# Patient Record
Sex: FEMALE | Race: WHITE | NOT HISPANIC OR LATINO | URBAN - METROPOLITAN AREA
[De-identification: names, ages, dates, MRNs, and addresses within clinical notes are randomized per-mention and may not be internally consistent; named-entity substitution may affect disease eponyms.]

---

## 2019-04-16 ENCOUNTER — EMERGENCY (EMERGENCY)
Facility: HOSPITAL | Age: 24
LOS: 1 days | Discharge: HOME | End: 2019-04-16
Attending: EMERGENCY MEDICINE | Admitting: EMERGENCY MEDICINE
Payer: COMMERCIAL

## 2019-04-16 VITALS
OXYGEN SATURATION: 100 % | HEART RATE: 88 BPM | SYSTOLIC BLOOD PRESSURE: 144 MMHG | RESPIRATION RATE: 14 BRPM | WEIGHT: 102.96 LBS | DIASTOLIC BLOOD PRESSURE: 74 MMHG | TEMPERATURE: 98 F

## 2019-04-16 PROCEDURE — 99283 EMERGENCY DEPT VISIT LOW MDM: CPT

## 2019-04-16 RX ORDER — CYCLOBENZAPRINE HYDROCHLORIDE 10 MG/1
1 TABLET, FILM COATED ORAL
Qty: 15 | Refills: 0 | OUTPATIENT
Start: 2019-04-16
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
## 2019-04-16 NOTE — ED ADULT NURSE NOTE - NSIMPLEMENTINTERV_GEN_ALL_ED
Implemented All Universal Safety Interventions:  [place] to call system. Call bell, personal items and telephone within reach. Instruct patient to call for assistance. Room bathroom lighting operational. Non-slip footwear when patient is off stretcher. Physically safe environment: no spills, clutter or unnecessary equipment. Stretcher in lowest position, wheels locked, appropriate side rails in place.

## 2023-02-06 ENCOUNTER — NON-APPOINTMENT (OUTPATIENT)
Age: 28
End: 2023-02-06

## 2023-03-13 ENCOUNTER — APPOINTMENT (OUTPATIENT)
Dept: OBGYN | Facility: CLINIC | Age: 28
End: 2023-03-13

## 2023-05-03 ENCOUNTER — APPOINTMENT (OUTPATIENT)
Dept: OBGYN | Facility: CLINIC | Age: 28
End: 2023-05-03

## 2023-06-01 NOTE — ED ADULT NURSE NOTE - NS ED NURSE RECORD ANOTHER VITAL SIGN
Called Raul.  He thinks he may want to proceed with cataract surgery on his left eye (mature cataract, hx of amblyopia).  He has talked it over with his family and they think he should \"take a chance on it\". He just wanted to discuss the risks that Dr. Paul had discussed with him in January again.  We went over the risk of removing a mature cataract, including loss of zonules and the need to then be referred to a retinal specialist for further surgery.  Raul said he would have trouble finding someone to drive him to [place].  I explained that he will want to just have someone in mind in the event a complication occurs.  Also discussed the need for an ultrasound (B-scan) on the left eye prior to surgery.  Said the nearest location to [place] for that would be [place].  We made an appt for Raul so Dr. Paul can get another look at the cataract and discuss a game plan with Raul.   
Patient is calling wanting to talk over having cataract surgery in their left eye.     Please Advise.    Thank you.   
No

## 2023-10-24 ENCOUNTER — APPOINTMENT (OUTPATIENT)
Dept: OBGYN | Facility: CLINIC | Age: 28
End: 2023-10-24

## 2023-11-06 PROBLEM — Z78.9 OTHER SPECIFIED HEALTH STATUS: Chronic | Status: ACTIVE | Noted: 2019-04-16

## 2023-11-29 ENCOUNTER — APPOINTMENT (OUTPATIENT)
Dept: INTERNAL MEDICINE | Facility: CLINIC | Age: 28
End: 2023-11-29
Payer: COMMERCIAL

## 2023-11-29 ENCOUNTER — TRANSCRIPTION ENCOUNTER (OUTPATIENT)
Age: 28
End: 2023-11-29

## 2023-11-29 VITALS
WEIGHT: 107 LBS | SYSTOLIC BLOOD PRESSURE: 134 MMHG | BODY MASS INDEX: 18.27 KG/M2 | HEART RATE: 69 BPM | HEIGHT: 64 IN | OXYGEN SATURATION: 100 % | DIASTOLIC BLOOD PRESSURE: 26 MMHG | TEMPERATURE: 98 F

## 2023-11-29 DIAGNOSIS — Z00.00 ENCOUNTER FOR GENERAL ADULT MEDICAL EXAMINATION W/OUT ABNORMAL FINDINGS: ICD-10-CM

## 2023-11-29 DIAGNOSIS — G43.909 MIGRAINE, UNSPECIFIED, NOT INTRACTABLE, W/OUT STATUS MIGRAINOSUS: ICD-10-CM

## 2023-11-29 DIAGNOSIS — Z83.438 FAMILY HISTORY OF OTHER DISORDER OF LIPOPROTEIN METABOLISM AND OTHER LIPIDEMIA: ICD-10-CM

## 2023-11-29 DIAGNOSIS — Z80.1 FAMILY HISTORY OF MALIGNANT NEOPLASM OF TRACHEA, BRONCHUS AND LUNG: ICD-10-CM

## 2023-11-29 DIAGNOSIS — Z78.9 OTHER SPECIFIED HEALTH STATUS: ICD-10-CM

## 2023-11-29 DIAGNOSIS — Z82.3 FAMILY HISTORY OF STROKE: ICD-10-CM

## 2023-11-29 DIAGNOSIS — Z87.42 PERSONAL HISTORY OF OTHER DISEASES OF THE FEMALE GENITAL TRACT: ICD-10-CM

## 2023-11-29 DIAGNOSIS — G43.009 MIGRAINE W/OUT AURA, NOT INTRACTABLE, W/OUT STATUS MIGRAINOSUS: ICD-10-CM

## 2023-11-29 DIAGNOSIS — Z80.0 FAMILY HISTORY OF MALIGNANT NEOPLASM OF DIGESTIVE ORGANS: ICD-10-CM

## 2023-11-29 DIAGNOSIS — Z83.3 FAMILY HISTORY OF DIABETES MELLITUS: ICD-10-CM

## 2023-11-29 PROCEDURE — 99385 PREV VISIT NEW AGE 18-39: CPT | Mod: 25

## 2023-11-29 PROCEDURE — 36415 COLL VENOUS BLD VENIPUNCTURE: CPT

## 2024-01-18 ENCOUNTER — NON-APPOINTMENT (OUTPATIENT)
Age: 29
End: 2024-01-18

## 2024-01-30 ENCOUNTER — APPOINTMENT (OUTPATIENT)
Dept: DERMATOLOGY | Facility: CLINIC | Age: 29
End: 2024-01-30
Payer: COMMERCIAL

## 2024-01-30 DIAGNOSIS — L70.9 ACNE, UNSPECIFIED: ICD-10-CM

## 2024-01-30 PROCEDURE — 99214 OFFICE O/P EST MOD 30 MIN: CPT

## 2024-01-30 RX ORDER — BENZOYL PEROXIDE 100 MG/ML
10 LIQUID TOPICAL
Qty: 1 | Refills: 6 | Status: ACTIVE | COMMUNITY
Start: 2024-01-30 | End: 1900-01-01

## 2024-01-30 RX ORDER — CLINDAMYCIN PHOSPHATE 10 MG/ML
1 LOTION TOPICAL
Qty: 1 | Refills: 11 | Status: ACTIVE | COMMUNITY
Start: 2024-01-30 | End: 1900-01-01

## 2024-01-30 RX ORDER — CLINDAMYCIN AND BENZOYL PEROXIDE 50; 10 MG/G; MG/G
1-5 GEL TOPICAL
Qty: 1 | Refills: 3 | Status: ACTIVE | COMMUNITY
Start: 2024-01-30 | End: 1900-01-01

## 2024-01-30 NOTE — ASSESSMENT
[FreeTextEntry1] : # Acne, mostly truncal, inflammatory, also with hormonal flares #PIH chronic, flaring prev on dagmar but unable to tolerate  planning on getting pregnant - education, counseling - c/w BP wash but increase to 10%. SED - SWITCH clinda gel--> benzaclin as spot tx - ADD clinda lotion as maint. SED  RTC 3 months
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
## 2024-02-04 LAB
25(OH)D3 SERPL-MCNC: 79.5 NG/ML
ALBUMIN SERPL ELPH-MCNC: 5.1 G/DL
ALP BLD-CCNC: 65 U/L
ALT SERPL-CCNC: 8 U/L
ANION GAP SERPL CALC-SCNC: 11 MMOL/L
APPEARANCE: ABNORMAL
AST SERPL-CCNC: 16 U/L
BACTERIA: NEGATIVE /HPF
BASOPHILS # BLD AUTO: 0.06 K/UL
BASOPHILS NFR BLD AUTO: 0.8 %
BILIRUB SERPL-MCNC: 0.2 MG/DL
BILIRUBIN URINE: NEGATIVE
BLOOD URINE: NEGATIVE
BUN SERPL-MCNC: 15 MG/DL
CALCIUM SERPL-MCNC: 9.8 MG/DL
CAST: 0 /LPF
CHLORIDE SERPL-SCNC: 102 MMOL/L
CHOLEST SERPL-MCNC: 163 MG/DL
CO2 SERPL-SCNC: 26 MMOL/L
COLOR: YELLOW
CREAT SERPL-MCNC: 0.91 MG/DL
EGFR: 88 ML/MIN/1.73M2
EOSINOPHIL # BLD AUTO: 0.06 K/UL
EOSINOPHIL NFR BLD AUTO: 0.8 %
EPITHELIAL CELLS: 11 /HPF
ESTIMATED AVERAGE GLUCOSE: 97 MG/DL
GLUCOSE QUALITATIVE U: NEGATIVE MG/DL
GLUCOSE SERPL-MCNC: 96 MG/DL
HBA1C MFR BLD HPLC: 5 %
HCT VFR BLD CALC: 41.1 %
HDLC SERPL-MCNC: 76 MG/DL
HGB BLD-MCNC: 13.6 G/DL
IMM GRANULOCYTES NFR BLD AUTO: 0.1 %
IRON SATN MFR SERPL: 30 %
IRON SERPL-MCNC: 99 UG/DL
KETONES URINE: NEGATIVE MG/DL
LDLC SERPL CALC-MCNC: 69 MG/DL
LEUKOCYTE ESTERASE URINE: ABNORMAL
LYMPHOCYTES # BLD AUTO: 3.71 K/UL
LYMPHOCYTES NFR BLD AUTO: 49.4 %
MAGNESIUM SERPL-MCNC: 2.2 MG/DL
MAN DIFF?: NORMAL
MCHC RBC-ENTMCNC: 30.4 PG
MCHC RBC-ENTMCNC: 33.1 GM/DL
MCV RBC AUTO: 91.9 FL
MICROSCOPIC-UA: NORMAL
MONOCYTES # BLD AUTO: 0.52 K/UL
MONOCYTES NFR BLD AUTO: 6.9 %
NEUTROPHILS # BLD AUTO: 3.15 K/UL
NEUTROPHILS NFR BLD AUTO: 42 %
NITRITE URINE: NEGATIVE
NONHDLC SERPL-MCNC: 87 MG/DL
PH URINE: 7.5
PLATELET # BLD AUTO: 344 K/UL
POTASSIUM SERPL-SCNC: 4.2 MMOL/L
PROT SERPL-MCNC: 7.4 G/DL
PROTEIN URINE: NORMAL MG/DL
RBC # BLD: 4.47 M/UL
RBC # FLD: 12 %
RED BLOOD CELLS URINE: 1 /HPF
SODIUM SERPL-SCNC: 140 MMOL/L
SPECIFIC GRAVITY URINE: 1.02
TIBC SERPL-MCNC: 334 UG/DL
TRIGL SERPL-MCNC: 102 MG/DL
UIBC SERPL-MCNC: 235 UG/DL
UROBILINOGEN URINE: 0.2 MG/DL
WBC # FLD AUTO: 7.51 K/UL
WHITE BLOOD CELLS URINE: 2 /HPF

## 2024-02-05 ENCOUNTER — TRANSCRIPTION ENCOUNTER (OUTPATIENT)
Age: 29
End: 2024-02-05

## 2024-02-15 ENCOUNTER — APPOINTMENT (OUTPATIENT)
Dept: OBGYN | Facility: CLINIC | Age: 29
End: 2024-02-15

## 2024-03-18 ENCOUNTER — APPOINTMENT (OUTPATIENT)
Dept: OBGYN | Facility: CLINIC | Age: 29
End: 2024-03-18
Payer: COMMERCIAL

## 2024-03-18 VITALS
HEIGHT: 64 IN | WEIGHT: 107 LBS | SYSTOLIC BLOOD PRESSURE: 126 MMHG | BODY MASS INDEX: 18.27 KG/M2 | DIASTOLIC BLOOD PRESSURE: 71 MMHG

## 2024-03-18 DIAGNOSIS — Z01.419 ENCOUNTER FOR GYNECOLOGICAL EXAMINATION (GENERAL) (ROUTINE) W/OUT ABNORMAL FINDINGS: ICD-10-CM

## 2024-03-18 PROCEDURE — 99385 PREV VISIT NEW AGE 18-39: CPT

## 2024-03-18 NOTE — PHYSICAL EXAM
[Appropriately responsive] : appropriately responsive [Alert] : alert [No Acute Distress] : no acute distress [No Murmurs] : no murmurs [Soft] : soft [Non-tender] : non-tender [Non-distended] : non-distended [No HSM] : No HSM [No Mass] : no mass [No Lesions] : no lesions [Oriented x3] : oriented x3 [Examination Of The Breasts] : a normal appearance [No Masses] : no breast masses were palpable [Labia Majora] : normal [Labia Minora] : normal [Normal] : normal [Uterine Adnexae] : normal

## 2024-03-18 NOTE — COUNSELING
[Preconception Care/ Fertility options] : preconception care, fertility options [Vitamins/Supplements] : vitamins/supplements

## 2024-03-26 LAB — CYTOLOGY CVX/VAG DOC THIN PREP: NORMAL

## 2024-04-06 ENCOUNTER — NON-APPOINTMENT (OUTPATIENT)
Age: 29
End: 2024-04-06

## 2024-05-03 ENCOUNTER — APPOINTMENT (OUTPATIENT)
Dept: OBGYN | Facility: CLINIC | Age: 29
End: 2024-05-03
Payer: COMMERCIAL

## 2024-05-03 ENCOUNTER — ASOB RESULT (OUTPATIENT)
Age: 29
End: 2024-05-03

## 2024-05-03 PROCEDURE — 76817 TRANSVAGINAL US OBSTETRIC: CPT

## 2024-05-13 ENCOUNTER — APPOINTMENT (OUTPATIENT)
Dept: OBGYN | Facility: CLINIC | Age: 29
End: 2024-05-13

## 2024-05-16 ENCOUNTER — APPOINTMENT (OUTPATIENT)
Dept: OBGYN | Facility: CLINIC | Age: 29
End: 2024-05-16
Payer: COMMERCIAL

## 2024-05-16 VITALS
WEIGHT: 118 LBS | SYSTOLIC BLOOD PRESSURE: 130 MMHG | DIASTOLIC BLOOD PRESSURE: 67 MMHG | BODY MASS INDEX: 20.14 KG/M2 | HEIGHT: 64 IN

## 2024-05-16 DIAGNOSIS — N91.1 SECONDARY AMENORRHEA: ICD-10-CM

## 2024-05-16 PROCEDURE — 99213 OFFICE O/P EST LOW 20 MIN: CPT

## 2024-05-16 PROCEDURE — 36415 COLL VENOUS BLD VENIPUNCTURE: CPT

## 2024-05-17 LAB
ABO + RH PNL BLD: NORMAL
ALBUMIN SERPL ELPH-MCNC: 4.5 G/DL
ALP BLD-CCNC: 66 U/L
ALT SERPL-CCNC: 10 U/L
ANION GAP SERPL CALC-SCNC: 13 MMOL/L
AST SERPL-CCNC: 15 U/L
BILIRUB SERPL-MCNC: 0.2 MG/DL
BLD GP AB SCN SERPL QL: NORMAL
BUN SERPL-MCNC: 9 MG/DL
C TRACH RRNA SPEC QL NAA+PROBE: NOT DETECTED
CALCIUM SERPL-MCNC: 9.5 MG/DL
CHLORIDE SERPL-SCNC: 103 MMOL/L
CO2 SERPL-SCNC: 22 MMOL/L
CREAT SERPL-MCNC: 0.55 MG/DL
EGFR: 128 ML/MIN/1.73M2
GLUCOSE SERPL-MCNC: 72 MG/DL
HBV SURFACE AG SER QL: NONREACTIVE
HCT VFR BLD CALC: 39.2 %
HCV AB SER QL: NONREACTIVE
HCV S/CO RATIO: 0.06 S/CO
HGB A MFR BLD: 97.5 %
HGB A2 MFR BLD: 2.5 %
HGB BLD-MCNC: 12.5 G/DL
HGB FRACT BLD-IMP: NORMAL
HIV1+2 AB SPEC QL IA.RAPID: NONREACTIVE
MCHC RBC-ENTMCNC: 30 PG
MCHC RBC-ENTMCNC: 31.9 GM/DL
MCV RBC AUTO: 94.2 FL
MEV IGG FLD QL IA: 19.1 AU/ML
MEV IGG+IGM SER-IMP: POSITIVE
N GONORRHOEA RRNA SPEC QL NAA+PROBE: NOT DETECTED
PLATELET # BLD AUTO: 305 K/UL
POTASSIUM SERPL-SCNC: 3.8 MMOL/L
PROT SERPL-MCNC: 7.2 G/DL
RBC # BLD: 4.16 M/UL
RBC # FLD: 13 %
RUBV IGG FLD-ACNC: 1.8 INDEX
RUBV IGG SER-IMP: POSITIVE
SODIUM SERPL-SCNC: 138 MMOL/L
SOURCE AMPLIFICATION: NORMAL
T PALLIDUM AB SER QL IA: NEGATIVE
TSH SERPL-ACNC: 2.4 UIU/ML
VZV AB TITR SER: POSITIVE
VZV IGG SER IF-ACNC: 356.3 INDEX
WBC # FLD AUTO: 8.27 K/UL

## 2024-05-21 ENCOUNTER — NON-APPOINTMENT (OUTPATIENT)
Age: 29
End: 2024-05-21

## 2024-05-21 LAB
B19V IGG SER QL IA: 0.27 INDEX
B19V IGG+IGM SER-IMP: NEGATIVE
B19V IGG+IGM SER-IMP: NORMAL
B19V IGM FLD-ACNC: 0.14 INDEX
B19V IGM SER-ACNC: NEGATIVE
LEAD BLD-MCNC: <1 UG/DL
MEV IGM SER QL: 0.28 AU

## 2024-05-22 LAB
AR GENE MUT ANL BLD/T: NORMAL
FMR1 GENE MUT ANL BLD/T: NORMAL

## 2024-05-28 LAB — CFTR MUT TESTED BLD/T: NEGATIVE

## 2024-05-30 ENCOUNTER — APPOINTMENT (OUTPATIENT)
Dept: ANTEPARTUM | Facility: CLINIC | Age: 29
End: 2024-05-30
Payer: COMMERCIAL

## 2024-05-30 ENCOUNTER — ASOB RESULT (OUTPATIENT)
Age: 29
End: 2024-05-30

## 2024-05-30 PROCEDURE — 76801 OB US < 14 WKS SINGLE FETUS: CPT

## 2024-05-30 PROCEDURE — 76813 OB US NUCHAL MEAS 1 GEST: CPT

## 2024-06-05 ENCOUNTER — APPOINTMENT (OUTPATIENT)
Dept: OBGYN | Facility: CLINIC | Age: 29
End: 2024-06-05
Payer: COMMERCIAL

## 2024-06-05 PROCEDURE — 0502F SUBSEQUENT PRENATAL CARE: CPT

## 2024-07-02 ENCOUNTER — LABORATORY RESULT (OUTPATIENT)
Age: 29
End: 2024-07-02

## 2024-07-02 ENCOUNTER — APPOINTMENT (OUTPATIENT)
Dept: OBGYN | Facility: CLINIC | Age: 29
End: 2024-07-02
Payer: COMMERCIAL

## 2024-07-02 PROCEDURE — 0502F SUBSEQUENT PRENATAL CARE: CPT

## 2024-07-22 ENCOUNTER — APPOINTMENT (OUTPATIENT)
Dept: ANTEPARTUM | Facility: CLINIC | Age: 29
End: 2024-07-22
Payer: COMMERCIAL

## 2024-07-22 ENCOUNTER — ASOB RESULT (OUTPATIENT)
Age: 29
End: 2024-07-22

## 2024-07-22 PROCEDURE — 76805 OB US >/= 14 WKS SNGL FETUS: CPT

## 2024-08-07 ENCOUNTER — NON-APPOINTMENT (OUTPATIENT)
Age: 29
End: 2024-08-07

## 2024-08-08 ENCOUNTER — APPOINTMENT (OUTPATIENT)
Dept: OBGYN | Facility: CLINIC | Age: 29
End: 2024-08-08

## 2024-08-12 ENCOUNTER — APPOINTMENT (OUTPATIENT)
Dept: OBGYN | Facility: CLINIC | Age: 29
End: 2024-08-12
Payer: COMMERCIAL

## 2024-08-12 DIAGNOSIS — N91.1 SECONDARY AMENORRHEA: ICD-10-CM

## 2024-08-12 PROCEDURE — 0501F PRENATAL FLOW SHEET: CPT

## 2024-08-19 LAB — BACTERIA UR CULT: NORMAL

## 2024-08-26 ENCOUNTER — NON-APPOINTMENT (OUTPATIENT)
Age: 29
End: 2024-08-26

## 2024-08-26 ENCOUNTER — APPOINTMENT (OUTPATIENT)
Dept: OBGYN | Facility: CLINIC | Age: 29
End: 2024-08-26
Payer: COMMERCIAL

## 2024-08-26 VITALS
SYSTOLIC BLOOD PRESSURE: 129 MMHG | WEIGHT: 134 LBS | HEIGHT: 64 IN | BODY MASS INDEX: 22.88 KG/M2 | DIASTOLIC BLOOD PRESSURE: 76 MMHG

## 2024-08-26 PROCEDURE — 0502F SUBSEQUENT PRENATAL CARE: CPT

## 2024-08-26 PROCEDURE — 36415 COLL VENOUS BLD VENIPUNCTURE: CPT

## 2024-08-28 LAB
GLUCOSE 1H P 50 G GLC PO SERPL-MCNC: 90 MG/DL
HCT VFR BLD CALC: 39.9 %
HGB BLD-MCNC: 11.9 G/DL
MCHC RBC-ENTMCNC: 29.8 GM/DL
MCHC RBC-ENTMCNC: 30.8 PG
MCV RBC AUTO: 103.4 FL
PLATELET # BLD AUTO: 293 K/UL
RBC # BLD: 3.86 M/UL
RBC # FLD: 13.9 %
WBC # FLD AUTO: 11.22 K/UL

## 2024-09-24 ENCOUNTER — NON-APPOINTMENT (OUTPATIENT)
Age: 29
End: 2024-09-24

## 2024-09-25 ENCOUNTER — APPOINTMENT (OUTPATIENT)
Dept: OBGYN | Facility: CLINIC | Age: 29
End: 2024-09-25
Payer: COMMERCIAL

## 2024-09-25 PROCEDURE — 90471 IMMUNIZATION ADMIN: CPT

## 2024-09-25 PROCEDURE — 90472 IMMUNIZATION ADMIN EACH ADD: CPT

## 2024-09-25 PROCEDURE — 0502F SUBSEQUENT PRENATAL CARE: CPT

## 2024-09-25 PROCEDURE — 90656 IIV3 VACC NO PRSV 0.5 ML IM: CPT

## 2024-09-25 PROCEDURE — 90715 TDAP VACCINE 7 YRS/> IM: CPT

## 2024-10-01 ENCOUNTER — APPOINTMENT (OUTPATIENT)
Dept: ANTEPARTUM | Facility: CLINIC | Age: 29
End: 2024-10-01
Payer: COMMERCIAL

## 2024-10-01 ENCOUNTER — ASOB RESULT (OUTPATIENT)
Age: 29
End: 2024-10-01

## 2024-10-01 PROCEDURE — 76816 OB US FOLLOW-UP PER FETUS: CPT

## 2024-10-01 PROCEDURE — 76819 FETAL BIOPHYS PROFIL W/O NST: CPT | Mod: 59

## 2024-10-14 ENCOUNTER — APPOINTMENT (OUTPATIENT)
Dept: OBGYN | Facility: CLINIC | Age: 29
End: 2024-10-14
Payer: COMMERCIAL

## 2024-10-14 PROCEDURE — 0502F SUBSEQUENT PRENATAL CARE: CPT

## 2024-10-30 ENCOUNTER — APPOINTMENT (OUTPATIENT)
Dept: OBGYN | Facility: CLINIC | Age: 29
End: 2024-10-30
Payer: COMMERCIAL

## 2024-10-30 PROCEDURE — 0502F SUBSEQUENT PRENATAL CARE: CPT

## 2024-10-30 PROCEDURE — 90678 RSV VACC PREF BIVALENT IM: CPT

## 2024-10-30 PROCEDURE — 90471 IMMUNIZATION ADMIN: CPT

## 2024-11-01 ENCOUNTER — NON-APPOINTMENT (OUTPATIENT)
Age: 29
End: 2024-11-01

## 2024-11-04 ENCOUNTER — NON-APPOINTMENT (OUTPATIENT)
Age: 29
End: 2024-11-04

## 2024-11-05 ENCOUNTER — NON-APPOINTMENT (OUTPATIENT)
Age: 29
End: 2024-11-05

## 2024-11-13 ENCOUNTER — NON-APPOINTMENT (OUTPATIENT)
Age: 29
End: 2024-11-13

## 2024-11-14 ENCOUNTER — APPOINTMENT (OUTPATIENT)
Dept: ANTEPARTUM | Facility: CLINIC | Age: 29
End: 2024-11-14
Payer: COMMERCIAL

## 2024-11-14 ENCOUNTER — ASOB RESULT (OUTPATIENT)
Age: 29
End: 2024-11-14

## 2024-11-14 ENCOUNTER — APPOINTMENT (OUTPATIENT)
Dept: OBGYN | Facility: CLINIC | Age: 29
End: 2024-11-14
Payer: COMMERCIAL

## 2024-11-14 PROCEDURE — 76819 FETAL BIOPHYS PROFIL W/O NST: CPT | Mod: 59

## 2024-11-14 PROCEDURE — 0502F SUBSEQUENT PRENATAL CARE: CPT

## 2024-11-14 PROCEDURE — 76816 OB US FOLLOW-UP PER FETUS: CPT

## 2024-11-17 LAB — B-HEM STREP SPEC QL CULT: ABNORMAL

## 2024-11-26 ENCOUNTER — NON-APPOINTMENT (OUTPATIENT)
Age: 29
End: 2024-11-26

## 2024-11-26 ENCOUNTER — APPOINTMENT (OUTPATIENT)
Dept: OBGYN | Facility: CLINIC | Age: 29
End: 2024-11-26

## 2024-11-27 ENCOUNTER — OUTPATIENT (OUTPATIENT)
Dept: INPATIENT UNIT | Facility: HOSPITAL | Age: 29
LOS: 1 days | Discharge: HOME | End: 2024-11-27
Payer: COMMERCIAL

## 2024-11-27 ENCOUNTER — APPOINTMENT (OUTPATIENT)
Dept: ANTEPARTUM | Facility: CLINIC | Age: 29
End: 2024-11-27

## 2024-11-27 ENCOUNTER — APPOINTMENT (OUTPATIENT)
Dept: OBGYN | Facility: CLINIC | Age: 29
End: 2024-11-27
Payer: COMMERCIAL

## 2024-11-27 VITALS — SYSTOLIC BLOOD PRESSURE: 121 MMHG | HEART RATE: 76 BPM | DIASTOLIC BLOOD PRESSURE: 73 MMHG

## 2024-11-27 VITALS
HEART RATE: 87 BPM | RESPIRATION RATE: 16 BRPM | DIASTOLIC BLOOD PRESSURE: 66 MMHG | SYSTOLIC BLOOD PRESSURE: 134 MMHG | TEMPERATURE: 98 F

## 2024-11-27 DIAGNOSIS — O26.899 OTHER SPECIFIED PREGNANCY RELATED CONDITIONS, UNSPECIFIED TRIMESTER: ICD-10-CM

## 2024-11-27 LAB
ALBUMIN SERPL ELPH-MCNC: 3.7 G/DL — SIGNIFICANT CHANGE UP (ref 3.3–5)
ALP SERPL-CCNC: 145 U/L — HIGH (ref 40–120)
ALT FLD-CCNC: 10 U/L — SIGNIFICANT CHANGE UP (ref 4–33)
ANION GAP SERPL CALC-SCNC: 13 MMOL/L — SIGNIFICANT CHANGE UP (ref 7–14)
APPEARANCE UR: ABNORMAL
AST SERPL-CCNC: 14 U/L — SIGNIFICANT CHANGE UP (ref 4–32)
BACTERIA # UR AUTO: ABNORMAL /HPF
BASOPHILS # BLD AUTO: 0.03 K/UL — SIGNIFICANT CHANGE UP (ref 0–0.2)
BASOPHILS NFR BLD AUTO: 0.3 % — SIGNIFICANT CHANGE UP (ref 0–2)
BILIRUB SERPL-MCNC: <0.2 MG/DL — SIGNIFICANT CHANGE UP (ref 0.2–1.2)
BILIRUB UR-MCNC: NEGATIVE — SIGNIFICANT CHANGE UP
BUN SERPL-MCNC: 8 MG/DL — SIGNIFICANT CHANGE UP (ref 7–23)
CALCIUM SERPL-MCNC: 9.3 MG/DL — SIGNIFICANT CHANGE UP (ref 8.4–10.5)
CAST: 7 /LPF — HIGH (ref 0–4)
CHLORIDE SERPL-SCNC: 103 MMOL/L — SIGNIFICANT CHANGE UP (ref 98–107)
CO2 SERPL-SCNC: 20 MMOL/L — LOW (ref 22–31)
COLOR SPEC: YELLOW — SIGNIFICANT CHANGE UP
CREAT ?TM UR-MCNC: 61 MG/DL — SIGNIFICANT CHANGE UP
CREAT SERPL-MCNC: 0.66 MG/DL — SIGNIFICANT CHANGE UP (ref 0.5–1.3)
DIFF PNL FLD: NEGATIVE — SIGNIFICANT CHANGE UP
EGFR: 122 ML/MIN/1.73M2 — SIGNIFICANT CHANGE UP
EOSINOPHIL # BLD AUTO: 0 K/UL — SIGNIFICANT CHANGE UP (ref 0–0.5)
EOSINOPHIL NFR BLD AUTO: 0 % — SIGNIFICANT CHANGE UP (ref 0–6)
GLUCOSE SERPL-MCNC: 99 MG/DL — SIGNIFICANT CHANGE UP (ref 70–99)
GLUCOSE UR QL: NEGATIVE MG/DL — SIGNIFICANT CHANGE UP
HCT VFR BLD CALC: 34.8 % — SIGNIFICANT CHANGE UP (ref 34.5–45)
HGB BLD-MCNC: 11.9 G/DL — SIGNIFICANT CHANGE UP (ref 11.5–15.5)
IANC: 7.7 K/UL — HIGH (ref 1.8–7.4)
IMM GRANULOCYTES NFR BLD AUTO: 1.2 % — HIGH (ref 0–0.9)
KETONES UR-MCNC: NEGATIVE MG/DL — SIGNIFICANT CHANGE UP
LDH SERPL L TO P-CCNC: 186 U/L — SIGNIFICANT CHANGE UP (ref 135–225)
LEUKOCYTE ESTERASE UR-ACNC: ABNORMAL
LYMPHOCYTES # BLD AUTO: 2.2 K/UL — SIGNIFICANT CHANGE UP (ref 1–3.3)
LYMPHOCYTES # BLD AUTO: 20.8 % — SIGNIFICANT CHANGE UP (ref 13–44)
MCHC RBC-ENTMCNC: 31.1 PG — SIGNIFICANT CHANGE UP (ref 27–34)
MCHC RBC-ENTMCNC: 34.2 G/DL — SIGNIFICANT CHANGE UP (ref 32–36)
MCV RBC AUTO: 90.9 FL — SIGNIFICANT CHANGE UP (ref 80–100)
MONOCYTES # BLD AUTO: 0.54 K/UL — SIGNIFICANT CHANGE UP (ref 0–0.9)
MONOCYTES NFR BLD AUTO: 5.1 % — SIGNIFICANT CHANGE UP (ref 2–14)
NEUTROPHILS # BLD AUTO: 7.7 K/UL — HIGH (ref 1.8–7.4)
NEUTROPHILS NFR BLD AUTO: 72.6 % — SIGNIFICANT CHANGE UP (ref 43–77)
NITRITE UR-MCNC: NEGATIVE — SIGNIFICANT CHANGE UP
NRBC # BLD: 0 /100 WBCS — SIGNIFICANT CHANGE UP (ref 0–0)
NRBC # FLD: 0 K/UL — SIGNIFICANT CHANGE UP (ref 0–0)
PH UR: 6 — SIGNIFICANT CHANGE UP (ref 5–8)
PLATELET # BLD AUTO: 282 K/UL — SIGNIFICANT CHANGE UP (ref 150–400)
POTASSIUM SERPL-MCNC: 4 MMOL/L — SIGNIFICANT CHANGE UP (ref 3.5–5.3)
POTASSIUM SERPL-SCNC: 4 MMOL/L — SIGNIFICANT CHANGE UP (ref 3.5–5.3)
PROT ?TM UR-MCNC: 6 MG/DL — SIGNIFICANT CHANGE UP
PROT SERPL-MCNC: 6.9 G/DL — SIGNIFICANT CHANGE UP (ref 6–8.3)
PROT UR-MCNC: NEGATIVE MG/DL — SIGNIFICANT CHANGE UP
PROT/CREAT UR-RTO: 0.1 RATIO — SIGNIFICANT CHANGE UP (ref 0–0.2)
RBC # BLD: 3.83 M/UL — SIGNIFICANT CHANGE UP (ref 3.8–5.2)
RBC # FLD: 12.8 % — SIGNIFICANT CHANGE UP (ref 10.3–14.5)
RBC CASTS # UR COMP ASSIST: 8 /HPF — HIGH (ref 0–4)
REVIEW: SIGNIFICANT CHANGE UP
SODIUM SERPL-SCNC: 136 MMOL/L — SIGNIFICANT CHANGE UP (ref 135–145)
SP GR SPEC: 1.01 — SIGNIFICANT CHANGE UP (ref 1–1.03)
SQUAMOUS # UR AUTO: 27 /HPF — HIGH (ref 0–5)
URATE SERPL-MCNC: 6 MG/DL — SIGNIFICANT CHANGE UP (ref 2.5–7)
UROBILINOGEN FLD QL: 0.2 MG/DL — SIGNIFICANT CHANGE UP (ref 0.2–1)
WBC # BLD: 10.6 K/UL — HIGH (ref 3.8–10.5)
WBC # FLD AUTO: 10.6 K/UL — HIGH (ref 3.8–10.5)
WBC UR QL: 108 /HPF — HIGH (ref 0–5)

## 2024-11-27 PROCEDURE — 76819 FETAL BIOPHYS PROFIL W/O NST: CPT | Mod: 26

## 2024-11-27 PROCEDURE — 59025 FETAL NON-STRESS TEST: CPT | Mod: 26

## 2024-11-27 PROCEDURE — 76815 OB US LIMITED FETUS(S): CPT | Mod: 26

## 2024-11-27 PROCEDURE — 99221 1ST HOSP IP/OBS SF/LOW 40: CPT | Mod: 25

## 2024-11-27 PROCEDURE — 0502F SUBSEQUENT PRENATAL CARE: CPT

## 2024-11-27 RX ORDER — .BETA.-CAROTENE, SODIUM ACETATE, ASCORBIC ACID, CHOLECALCIFEROL, .ALPHA.-TOCOPHEROL ACETATE, DL-, THIAMINE MONONITRATE, RIBOFLAVIN, NIACINAMIDE, PYRIDOXINE HYDROCHLORIDE, FOLIC ACID, CYANOCOBALAMIN, CALCIUM CARBONATE, FERROUS FUMARATE, ZINC OXIDE AND CUPRIC OXIDE 2000; 2000; 120; 400; 22; 1.84; 3; 20; 10; 1; 12; 200; 27; 25; 2 [IU]/1; [IU]/1; MG/1; [IU]/1; MG/1; MG/1; MG/1; MG/1; MG/1; MG/1; UG/1; MG/1; MG/1; MG/1; MG/1
1 TABLET ORAL
Refills: 0 | DISCHARGE

## 2024-11-27 NOTE — OB PROVIDER TRIAGE NOTE - NS_FETALHEARTRATE_OBGYN_ALL_OB_FT
Price (Do Not Change): 0.00
Detail Level: Simple
Instructions: This plan will send the code FBSE to the PM system.  DO NOT or CHANGE the price.
163

## 2024-11-27 NOTE — OB PROVIDER TRIAGE NOTE - ADDITIONAL INSTRUCTIONS
Echo is fine  No changes from us  Proceed as per his oncologist  
Incoming fax from [facility]    \"Dear Dr Moy,    Please see the following echo and clinical on shared pt Adriel Hickey.  Pt will be starting Doxil at the [facility] on 2/15/2022\"    Placed in the yellow NP folder- please advise.  
John Shannon from [facility] called back- reviewed CNP recommendations. Pt will be starting treatment there. Jonh will let Dr Duran know. Any further questions please call our office back to discuss.    
follow up with OB provider as scheduled.  urine culture sent - expect follow up phone call if results positive   increase fluid hydration.  reviewed fetal kick count and labor precautions  call OB/return to triage with change in symptoms and or concerns such as decreased fetal movement, leakage of fluid, vaginal bleeding, contractions, pain, fever, headache, blurry vision.

## 2024-11-27 NOTE — OB PROVIDER TRIAGE NOTE - NSHPLABSRESULTS_GEN_ALL_CORE
Urinalysis Basic - ( 2024 21:35 )    Color: Yellow / Appearance: Cloudy / S.011 / pH: x  Gluc: 99 mg/dL / Ketone: Negative mg/dL  / Bili: Negative / Urobili: 0.2 mg/dL   Blood: x / Protein: Negative mg/dL / Nitrite: Negative   Leuk Esterase: Large / RBC: 8 /HPF /  /HPF   Sq Epi: x / Non Sq Epi: 27 /HPF / Bacteria: Moderate /HPF    CBC Full  -  ( 2024 21:35 )  WBC Count : 10.60 K/uL  RBC Count : 3.83 M/uL  Hemoglobin : 11.9 g/dL  Hematocrit : 34.8 %  Platelet Count - Automated : 282 K/uL  Mean Cell Volume : 90.9 fL  Mean Cell Hemoglobin : 31.1 pg  Mean Cell Hemoglobin Concentration : 34.2 g/dL  Auto Neutrophil # : 7.70 K/uL  Auto Lymphocyte # : 2.20 K/uL  Auto Monocyte # : 0.54 K/uL  Auto Eosinophil # : 0.00 K/uL  Auto Basophil # : 0.03 K/uL  Auto Neutrophil % : 72.6 %  Auto Lymphocyte % : 20.8 %  Auto Monocyte % : 5.1 %  Auto Eosinophil % : 0.0 %  Auto Basophil % : 0.3 %        136  |  103  |  8   ----------------------------<  99  4.0   |  20[L]  |  0.66    Ca    9.3      2024 21:35    TPro  6.9  /  Alb  3.7  /  TBili  <0.2  /  DBili  x   /  AST  14  /  ALT  10  /  AlkPhos  145[H]

## 2024-11-27 NOTE — OB PROVIDER TRIAGE NOTE - HISTORY OF PRESENT ILLNESS
29 year old female  @ 38.3GA With SLIUP presents to triage from OB office for evaluation of elevated BP. Pt had routine visit and BP @ 1:20pm was 158/83 and repeat  (5 minutes later) 153/82. Pt attributes elevated BP to white-coat syndrome. Pt denies complaints.   reports GFM. denies vaginal bleeding, leakage of fluid, contractions, pain.   Denies fever, chills, headaches, changes in vision, chest pain, palpitations, shortness of breath, cough, nausea, vomiting, diarrhea, constipation, urinary symptoms, edema, RUQ pain.   PNC with Dr Frankel - last visit today, next visit 24.  GBS pos 24 29 year old female  @ 38.3GA With SLIUP presents to triage from OB office for evaluation of elevated BP. Pt had routine visit and BP @ 1:20pm was 158/83 and repeat  (5 minutes later) 153/82. Pt attributes elevated BP to white-coat syndrome. Pt denies complaints.   reports GFM. denies vaginal bleeding, leakage of fluid, contractions, pain.   Denies fever, chills, headaches, changes in vision, chest pain, palpitations, shortness of breath, cough, nausea, vomiting, diarrhea, constipation, urinary symptoms, edema, RUQ pain.     PNC with Dr Frankel - last visit today, next visit 24.  GBS pos 24

## 2024-11-27 NOTE — OB PROVIDER TRIAGE NOTE - NSOBPROVIDERNOTE_OBGYN_ALL_OB_FT
29 year old female  @ 38.3GA With SLIUP presents to triage from OB office for evaluation of elevated BP in office    HELLP labs ordered  Vital signs stable, BP normotensive    NST reactive  TOCO irregular contractions, Pt denies feeling contractions. defers VE   BPP 8/8, vertex, ADRIAN 14.8 29 year old female  @ 38.3GA With SLIUP presents to triage from OB office for evaluation of elevated BP in office    HELLP labs ordered  Vital signs stable, BP normotensive    NST reactive  TOCO irregular contractions, Pt denies feeling contractions. defers VE   BPP 8/8, vertex, ADRIAN 14.8    UA large leuks, 108 WBC, contaminated -urine culture ordered - pt asymptomatic   PCR 0.1  HELLP wnl    d/w Dr Goldberg: discharge home, send urine culture (not to treat at this time)    no evidence PEC, HTN  maternal/fetal surveillance reassuring  d/w Dr Goldberg: discharge home  follow up with OB provider as scheduled.  urine culture sent - expect follow up phone call if results positive   increase fluid hydration.  reviewed fetal kick count and labor precautions  call OB/return to triage with change in symptoms and or concerns such as decreased fetal movement, leakage of fluid, vaginal bleeding, contractions, pain, fever, headache, blurry vision.  addressed questions and concerns  reviewed labor precautions and discharge papers. pt verbalized understanding and signed.    Discharged 23:45

## 2024-11-27 NOTE — OB RN TRIAGE NOTE - NSLDARRIVAL_OBGYN_ALL_OB_DIFF_HHMM
Post-Care Instructions: I reviewed with the patient in detail post-care instructions. Patient is to wear sunprotection, and avoid picking at any of the treated lesions. Pt may apply Vaseline to crusted or scabbing areas. Consent: The patient's consent was obtained including but not limited to risks of crusting, scabbing, blistering, scarring, darker or lighter pigmentary change, recurrence, incomplete removal and infection. Anesthesia Volume In Cc: 0 Detail Level: Zone 7 Hour(s) 47 Minute(s)

## 2024-11-27 NOTE — OB PROVIDER TRIAGE NOTE - NS_OBGYNHISTORY_OBGYN_ALL_OB_FT
Ob: primigravida   Gyn: ovarian cyst- ruptured      denies hx fibroids, abnormal pap smears, STDs, HSV

## 2024-11-28 DIAGNOSIS — R03.0 ELEVATED BLOOD-PRESSURE READING, WITHOUT DIAGNOSIS OF HYPERTENSION: ICD-10-CM

## 2024-11-28 DIAGNOSIS — N83.209 UNSPECIFIED OVARIAN CYST, UNSPECIFIED SIDE: ICD-10-CM

## 2024-11-28 DIAGNOSIS — O34.83 MATERNAL CARE FOR OTHER ABNORMALITIES OF PELVIC ORGANS, THIRD TRIMESTER: ICD-10-CM

## 2024-11-28 DIAGNOSIS — Z3A.38 38 WEEKS GESTATION OF PREGNANCY: ICD-10-CM

## 2024-11-28 DIAGNOSIS — R82.998 OTHER ABNORMAL FINDINGS IN URINE: ICD-10-CM

## 2024-11-28 DIAGNOSIS — O26.893 OTHER SPECIFIED PREGNANCY RELATED CONDITIONS, THIRD TRIMESTER: ICD-10-CM

## 2024-11-29 LAB
CULTURE RESULTS: SIGNIFICANT CHANGE UP
SPECIMEN SOURCE: SIGNIFICANT CHANGE UP

## 2024-12-03 ENCOUNTER — NON-APPOINTMENT (OUTPATIENT)
Age: 29
End: 2024-12-03

## 2024-12-04 ENCOUNTER — APPOINTMENT (OUTPATIENT)
Dept: OBGYN | Facility: CLINIC | Age: 29
End: 2024-12-04
Payer: COMMERCIAL

## 2024-12-04 LAB
ALBUMIN SERPL ELPH-MCNC: 3.5 G/DL
ALP BLD-CCNC: 145 U/L
ALT SERPL-CCNC: 9 U/L
ANION GAP SERPL CALC-SCNC: 15 MMOL/L
AST SERPL-CCNC: 14 U/L
BILIRUB SERPL-MCNC: <0.2 MG/DL
BUN SERPL-MCNC: 8 MG/DL
CALCIUM SERPL-MCNC: 9.6 MG/DL
CHLORIDE SERPL-SCNC: 104 MMOL/L
CO2 SERPL-SCNC: 18 MMOL/L
CREAT SERPL-MCNC: 0.65 MG/DL
EGFR: 122 ML/MIN/1.73M2
GLUCOSE SERPL-MCNC: 118 MG/DL
HCT VFR BLD CALC: 34.2 %
HGB BLD-MCNC: 11.3 G/DL
LDH SERPL-CCNC: 168 U/L
MCHC RBC-ENTMCNC: 30.9 PG
MCHC RBC-ENTMCNC: 33 G/DL
MCV RBC AUTO: 93.4 FL
PLATELET # BLD AUTO: 281 K/UL
POTASSIUM SERPL-SCNC: 4 MMOL/L
PROT SERPL-MCNC: 6.1 G/DL
RBC # BLD: 3.66 M/UL
RBC # FLD: 13.2 %
SODIUM SERPL-SCNC: 137 MMOL/L
URATE SERPL-MCNC: 6.4 MG/DL
WBC # FLD AUTO: 9.52 K/UL

## 2024-12-04 PROCEDURE — 0502F SUBSEQUENT PRENATAL CARE: CPT

## 2024-12-04 PROCEDURE — 36415 COLL VENOUS BLD VENIPUNCTURE: CPT

## 2024-12-05 ENCOUNTER — INPATIENT (INPATIENT)
Facility: HOSPITAL | Age: 29
LOS: 2 days | Discharge: HOME | End: 2024-12-08
Attending: OBSTETRICS & GYNECOLOGY | Admitting: OBSTETRICS & GYNECOLOGY
Payer: COMMERCIAL

## 2024-12-05 VITALS — HEART RATE: 74 BPM | SYSTOLIC BLOOD PRESSURE: 135 MMHG | DIASTOLIC BLOOD PRESSURE: 74 MMHG

## 2024-12-05 DIAGNOSIS — O13.3 GESTATIONAL [PREGNANCY-INDUCED] HYPERTENSION WITHOUT SIGNIFICANT PROTEINURIA, THIRD TRIMESTER: ICD-10-CM

## 2024-12-05 LAB
ALBUMIN SERPL ELPH-MCNC: 3.8 G/DL — SIGNIFICANT CHANGE UP (ref 3.3–5)
ALP SERPL-CCNC: 142 U/L — HIGH (ref 40–120)
ALT FLD-CCNC: 11 U/L — SIGNIFICANT CHANGE UP (ref 4–33)
ANION GAP SERPL CALC-SCNC: 17 MMOL/L — HIGH (ref 7–14)
APPEARANCE UR: CLEAR — SIGNIFICANT CHANGE UP
AST SERPL-CCNC: 18 U/L — SIGNIFICANT CHANGE UP (ref 4–32)
BASOPHILS # BLD AUTO: 0.05 K/UL — SIGNIFICANT CHANGE UP (ref 0–0.2)
BASOPHILS NFR BLD AUTO: 0.5 % — SIGNIFICANT CHANGE UP (ref 0–2)
BILIRUB SERPL-MCNC: <0.2 MG/DL — SIGNIFICANT CHANGE UP (ref 0.2–1.2)
BILIRUB UR-MCNC: NEGATIVE — SIGNIFICANT CHANGE UP
BLD GP AB SCN SERPL QL: NEGATIVE — SIGNIFICANT CHANGE UP
BUN SERPL-MCNC: 8 MG/DL — SIGNIFICANT CHANGE UP (ref 7–23)
CALCIUM SERPL-MCNC: 9.6 MG/DL — SIGNIFICANT CHANGE UP (ref 8.4–10.5)
CHLORIDE SERPL-SCNC: 103 MMOL/L — SIGNIFICANT CHANGE UP (ref 98–107)
CO2 SERPL-SCNC: 17 MMOL/L — LOW (ref 22–31)
COLOR SPEC: YELLOW — SIGNIFICANT CHANGE UP
CREAT ?TM UR-MCNC: 44 MG/DL — SIGNIFICANT CHANGE UP
CREAT SERPL-MCNC: 0.62 MG/DL — SIGNIFICANT CHANGE UP (ref 0.5–1.3)
DIFF PNL FLD: NEGATIVE — SIGNIFICANT CHANGE UP
EGFR: 124 ML/MIN/1.73M2 — SIGNIFICANT CHANGE UP
EOSINOPHIL # BLD AUTO: 0.02 K/UL — SIGNIFICANT CHANGE UP (ref 0–0.5)
EOSINOPHIL NFR BLD AUTO: 0.2 % — SIGNIFICANT CHANGE UP (ref 0–6)
GLUCOSE SERPL-MCNC: 82 MG/DL — SIGNIFICANT CHANGE UP (ref 70–99)
GLUCOSE UR QL: NEGATIVE MG/DL — SIGNIFICANT CHANGE UP
HCT VFR BLD CALC: 35.2 % — SIGNIFICANT CHANGE UP (ref 34.5–45)
HGB BLD-MCNC: 11.7 G/DL — SIGNIFICANT CHANGE UP (ref 11.5–15.5)
IANC: 7.49 K/UL — HIGH (ref 1.8–7.4)
IMM GRANULOCYTES NFR BLD AUTO: 1.4 % — HIGH (ref 0–0.9)
KETONES UR-MCNC: NEGATIVE MG/DL — SIGNIFICANT CHANGE UP
LDH SERPL L TO P-CCNC: 179 U/L — SIGNIFICANT CHANGE UP (ref 135–225)
LEUKOCYTE ESTERASE UR-ACNC: NEGATIVE — SIGNIFICANT CHANGE UP
LYMPHOCYTES # BLD AUTO: 2.66 K/UL — SIGNIFICANT CHANGE UP (ref 1–3.3)
LYMPHOCYTES # BLD AUTO: 24.2 % — SIGNIFICANT CHANGE UP (ref 13–44)
MCHC RBC-ENTMCNC: 30.1 PG — SIGNIFICANT CHANGE UP (ref 27–34)
MCHC RBC-ENTMCNC: 33.2 G/DL — SIGNIFICANT CHANGE UP (ref 32–36)
MCV RBC AUTO: 90.5 FL — SIGNIFICANT CHANGE UP (ref 80–100)
MONOCYTES # BLD AUTO: 0.61 K/UL — SIGNIFICANT CHANGE UP (ref 0–0.9)
MONOCYTES NFR BLD AUTO: 5.6 % — SIGNIFICANT CHANGE UP (ref 2–14)
NEUTROPHILS # BLD AUTO: 7.49 K/UL — HIGH (ref 1.8–7.4)
NEUTROPHILS NFR BLD AUTO: 68.1 % — SIGNIFICANT CHANGE UP (ref 43–77)
NITRITE UR-MCNC: NEGATIVE — SIGNIFICANT CHANGE UP
NRBC # BLD: 0 /100 WBCS — SIGNIFICANT CHANGE UP (ref 0–0)
NRBC # FLD: 0 K/UL — SIGNIFICANT CHANGE UP (ref 0–0)
PH UR: 7 — SIGNIFICANT CHANGE UP (ref 5–8)
PLATELET # BLD AUTO: 295 K/UL — SIGNIFICANT CHANGE UP (ref 150–400)
POTASSIUM SERPL-MCNC: 3.9 MMOL/L — SIGNIFICANT CHANGE UP (ref 3.5–5.3)
POTASSIUM SERPL-SCNC: 3.9 MMOL/L — SIGNIFICANT CHANGE UP (ref 3.5–5.3)
PROT ?TM UR-MCNC: 7 MG/DL — SIGNIFICANT CHANGE UP
PROT SERPL-MCNC: 7.1 G/DL — SIGNIFICANT CHANGE UP (ref 6–8.3)
PROT UR-MCNC: NEGATIVE MG/DL — SIGNIFICANT CHANGE UP
PROT/CREAT UR-RTO: 0.2 RATIO — SIGNIFICANT CHANGE UP (ref 0–0.2)
RBC # BLD: 3.89 M/UL — SIGNIFICANT CHANGE UP (ref 3.8–5.2)
RBC # FLD: 13.2 % — SIGNIFICANT CHANGE UP (ref 10.3–14.5)
RH IG SCN BLD-IMP: POSITIVE — SIGNIFICANT CHANGE UP
RH IG SCN BLD-IMP: POSITIVE — SIGNIFICANT CHANGE UP
SODIUM SERPL-SCNC: 137 MMOL/L — SIGNIFICANT CHANGE UP (ref 135–145)
SP GR SPEC: 1.01 — SIGNIFICANT CHANGE UP (ref 1–1.03)
URATE SERPL-MCNC: 6.6 MG/DL — SIGNIFICANT CHANGE UP (ref 2.5–7)
UROBILINOGEN FLD QL: 0.2 MG/DL — SIGNIFICANT CHANGE UP (ref 0.2–1)
WBC # BLD: 10.98 K/UL — HIGH (ref 3.8–10.5)
WBC # FLD AUTO: 10.98 K/UL — HIGH (ref 3.8–10.5)

## 2024-12-05 RX ORDER — TRISODIUM CITRATE DIHYDRATE AND CITRIC ACID MONOHYDRATE 500; 334 MG/5ML; MG/5ML
15 SOLUTION ORAL EVERY 6 HOURS
Refills: 0 | Status: DISCONTINUED | OUTPATIENT
Start: 2024-12-05 | End: 2024-12-07

## 2024-12-05 RX ORDER — 0.9 % SODIUM CHLORIDE 0.9 %
1000 INTRAVENOUS SOLUTION INTRAVENOUS
Refills: 0 | Status: DISCONTINUED | OUTPATIENT
Start: 2024-12-05 | End: 2024-12-07

## 2024-12-05 RX ORDER — CHLORHEXIDINE GLUCONATE 1.2 MG/ML
1 RINSE ORAL DAILY
Refills: 0 | Status: DISCONTINUED | OUTPATIENT
Start: 2024-12-05 | End: 2024-12-07

## 2024-12-05 RX ORDER — AMPICILLIN TRIHYDRATE 250 MG/5ML
1 SUSPENSION, RECONSTITUTED, ORAL (ML) ORAL EVERY 4 HOURS
Refills: 0 | Status: DISCONTINUED | OUTPATIENT
Start: 2024-12-05 | End: 2024-12-06

## 2024-12-05 RX ORDER — AMPICILLIN TRIHYDRATE 250 MG/5ML
2 SUSPENSION, RECONSTITUTED, ORAL (ML) ORAL ONCE
Refills: 0 | Status: COMPLETED | OUTPATIENT
Start: 2024-12-05 | End: 2024-12-06

## 2024-12-05 NOTE — OB RN PATIENT PROFILE - POST PARTUM DEPRESSION SCREEN OB 2
patient presenting with back pain no midline pain or tenderness ambulatory but with pain clinically appears to MSK pain no red flags we will treat pain and reassess
no

## 2024-12-05 NOTE — OB PROVIDER H&P - HISTORY OF PRESENT ILLNESS
29y  at 39w 3d who presents to L&D for IOL for gHTN. Patient states that she was told her blood pressures were elevated last week and that she should have an induction of labor for delivery. At time of arrival to L&D, pt now stating that she would like an elective c/s.  Patient denies vaginal bleeding, contractions and leakage of fluid. She endorses good fetal movement. Denies fevers, chills, nausea, vomiting, chest pain, SOB, dizziness and headache. No other complaints at this time. Last ate at ~5p.    AL:     Prenatal course is significant for: gHTN, GBS pos, EFW 3200g    POB: Denies  PGYN: -fibroids, denies STD hx, denies abnormal PAPs, +hx ruptured ovarian cyst  PMH: Denies  PSH: Denies  SH: Denies EtOH, tobacco and illicit drug use during this pregnancy  Meds: PNVs, bASA  Allergies: NKDA      T(C): --  HR: 74 (24 @ 21:50) (74 - 74)  BP: 135/74 (24 @ 21:50) (135/74 - 135/74)  RR: --  SpO2: --    Gen: NAD, well-appearing, AAOx3   Abd: Soft, gravid  Ext: non-tender, non-edematous    SVE:  Not performed  Bedside sono: Vertex, Anterior placenta  FHT: Baseline 150,   [place]:       A/P: 29y  at 39w 3d who presents to L&D for IOL for gHTN.  -Admit to L&D  -Consent  -Admission labs  -NPO, except ice chips   -IV fluids  -Labor: Intact/*ROM. Latent/Active labor. Anthony *.   -Fetus: Cat I tracing. Continuous toco and fetal monitoring.   -GBS: Negative, no GBS ppx required   -Analgesia:     Discussed with Dr. Hammonds 29y  at 39w 3d who presents to L&D for IOL for gHTN. Patient states that she was told her blood pressures were elevated last week and that she should have an induction of labor for delivery. At time of arrival to L&D, pt now stating that she would like an elective c/s.  Patient denies vaginal bleeding, contractions and leakage of fluid. She endorses good fetal movement. Denies fevers, chills, nausea, vomiting, chest pain, SOB, dizziness and headache. No other complaints at this time. Last ate at ~5p.    AL:     Prenatal course is significant for: gHTN, GBS pos, EFW 3200g    POB: Denies  PGYN: -fibroids, denies STD hx, denies abnormal PAPs, +hx ruptured ovarian cyst  PMH: Denies  PSH: Denies  SH: Denies EtOH, tobacco and illicit drug use during this pregnancy  Meds: PNVs, bASA  Allergies: NKDA

## 2024-12-05 NOTE — OB PROVIDER H&P - NSHPPHYSICALEXAM_GEN_ALL_CORE
T(C): --  HR: 74 (12-05-24 @ 21:50) (74 - 74)  BP: 135/74 (12-05-24 @ 21:50) (135/74 - 135/74)  RR: --  SpO2: --    Gen: NAD, well-appearing, AAOx3   Abd: Soft, gravid  Ext: non-tender, non-edematous    SVE:  Not performed  Bedside sono: Vertex, Anterior placenta  FHT: Baseline 150,   [place]: q5min

## 2024-12-05 NOTE — OB RN PATIENT PROFILE - NS_ARRIVALINFOCOMMENT_OBGYN_ALL_OB_FT
pt scheduled IOL GHTN, pt requesting elective pLTCS. Pt reports discussion of POC with MD Frankel in office 12/4 and on phone 12/5, pt instructed by MD Frankel to come in for IOL and request CS

## 2024-12-05 NOTE — OB PROVIDER H&P - ASSESSMENT
29y  at 39w 3d who presents to L&D for IOL for gHTN who would like to proceed with primary c/s.    -Admit to L&D for primary c/s in AM.   -Consent for blood transfusion signed. Pt to be consented for c/s   -Admission labs/ HELLP  -NPO  -IV fluids  -Fetus: Reactive NST  -GBS pos  -Pt will need to be evaluated by Anesthesia prior to c/s    Discussed with Dr. Ann and Dr. Mckee PGY-4  Yue Sunshine, PGY-1

## 2024-12-05 NOTE — OB RN PATIENT PROFILE - NS_PRENATALHARD_OBGYN_ALL_OB
Chief Complaint   Patient presents with   • Diabetes     labs, fuv         HISTORY OF PRESENT ILLNESS:  Patient presents to office for follow-up on a number of issues including depression, diabetes mellitus, hypothyroidism, hypertension. Patient states that her fibromyalgia is been bothering her more. She is still more sore and more achy and has not been out of the house since Judit. She is very limited in mobility and walking due to her fibromyalgia. Patient is basically housebound in a wheelchair although she does walk a little bit. She has minimal ability to call, she does not clean her  is pretty much taking care of everything for her. She states her blood sugars have been up and down. She has been compliant with her medications but reports no side effects. Patient also has a history of sleep apnea. She does have C Pap machine but does need a mask to hookup her oxygen while using C Pap. Patient also would like to wean off the Effexor she does not believe it is effective.    Past Medical History:   Diagnosis Date   • Anemia    • Anesthesia complication     difficulty waking up after D & C in the past. Episode of anesthesia awareness during back surgery in 1989- was having anaphylactic RX to vancomycin, felt someone touching her and heard voices in the middle of OR)   • Anxiety and depression    • Arthritis    • Atrial fibrillation 10/26/14   • Back pain     needs to have a pillow under knees to relieve back pain.    • Colon polyps    • Diabetes mellitus     type II   • Essential (primary) hypertension    • Fibromyalgia    • GERD (gastroesophageal reflux disease)    • Hemorrhoids    • Hypercholesteremia    • IBS (irritable bowel syndrome)    • Migraines    • Mitral valve prolapse    • Osteopenia    • Osteoporosis, unspecified 01/25/2012   • Pneumonia     HX of   • PONV (postoperative nausea and vomiting)    • RAD (reactive airway disease)    • Sleep apnea     uses CPAP   • Stress incontinence,  female    • Thyroid disease     hypothyroid     Outpatient Prescriptions Marked as Taking for the 3/9/17 encounter (Office Visit) with CHRISSIE Gamez   Medication Sig Dispense Refill   • EVISTA 60 MG tablet TAKE 1 TABLET BY MOUTH EVERY DAY 30 tablet 0   • cyanocobalamin 1000 MCG/ML injection INJECT 1 ML SUBCUTANEOUSLY EVERY 3 WEEKS 18 mL 0   • Alcohol Swabs (B-D SINGLE USE SWABS REGULAR) Pads USE AS DIRECTED 100 each 11   • potassium chloride (K-DUR,KLOR-CON) 20 MEQ CR tablet TAKE 1 TABLET BY MOUTH DAILY 30 tablet 3   • Lancets (FREESTYLE) Misc TEST FOUR TIMES DAILY 100 each 6   • DIAZepam (VALIUM) 5 MG tablet TAKE 1 TABLET BY MOUTH THREE TIMES DAILY AS NEEDED FOR MUSCLE SPASMS 30 tablet 0   • NOVOLOG FLEXPEN 100 UNIT/ML injection INJECT 20 UNITS INTO SKIN THREE TIMES DAILY(MAX 84 UNITS DAILY) (Patient taking differently: INJECT  40  UNITS INTO SKIN THREE TIMES DAILY(MAX 84 UNITS DAILY)) 30 mL 3   • CARTIA  MG 24 hr capsule TAKE 1 CAPSULE BY MOUTH DAILY 90 capsule 1   • diclofenac (VOLTAREN) 75 MG EC tablet TAKE 1 TABLET BY MOUTH TWICE DAILY AS NEEDED FOR PAIN 60 tablet 6   • ARMOUR THYROID 60 MG tablet TAKE 1 TABLET BY MOUTH DAILY 30 tablet 11   • DIOVAN 80 MG tablet TAKE 1 TABLET BY MOUTH TWICE DAILY 60 tablet 11   • FREESTYLE LITE TEST 6 TIMES DAILY 500 strip 0   • EPINEPHrine (EPIPEN 2-VIDHYA) 0.3 MG/0.3ML Solution Auto-injector Inject 0.3 mLs into the muscle as directed. 2 each 0   • NYSTOP (NYSTOP) 470274 UNIT/GM Powder Apply topically every 12 hours. 60 g 4   • fluticasone-salmeterol (ADVAIR DISKUS) 500-50 MCG/DOSE AEROSOL POWDER, BREATH ACTIVATED Inhale 1 puff into the lungs two times daily. 1 each 11   • Insulin Syringe-Needle U-100 (B-D INSULIN SYRINGE) 25G X 1\" 1 ML Misc every 21 days. 18 each 1   • LANTUS SOLOSTAR 100 UNIT/ML injection ADMINISTER 35 UNITS UNDER THE SKIN EVERY NIGHT AT BEDTIME (Patient taking differently: ADMINISTER 40 UNITS UNDER THE SKIN EVERY NIGHT AT BEDTIME) 15 mL 6   • BD  PEN NEEDLE ITZ U/F 32G X 4 MM Misc USE AS DIRECTED 200 each 11   • DISPENSE Bariatric manual wheelchair Diagnosis: polyneuropathy with diabetes, fibromyalgia, chronic knee pain, obesity, shortness of breath weight: 343LB height: 5'5\" Provider NPI: [id number] Preferred vendor: [employer] Device PRN   • DISPENSE CPAP equipment and supplies Diagnosis: obstructive sleep apnea, obesity, shortness of breath weight: 343LB height: 5'5\" Provider NPI: [id number] Preferred vendor: [employer]   • DISPENSE Residential, bariatric stair lift device. Diagnosis: polyneuropathy with diabetes, fibromyalgia, chronic knee pain, obesity, shortness of breath 1 Device PRN   • insulin glargine (LANTUS SOLOSTAR) 100 UNIT/ML injection 40 units nightly 15 mL 2   • EFFEXOR XR 75 MG 24 hr capsule TAKE ONE CAPSULE BY MOUTH DAILY 30 capsule 3   • PROVENTIL  (90 BASE) MCG/ACT inhaler INHALE 2 PUFFS INTO THE LUNGS EVERY 4 HOURS AS NEEDED FOR SHORTNESS OF BREATH OR WHEEZING 6.7 g 3   • Aspirin (ANACIN PO) Take 400-800 mg by mouth as needed. Indications: fibromyalgia     • aspirin 81 MG tablet Take 81 mg by mouth 2 times daily.     • cholecalciferol (VITAMIN D3) 1000 UNITS tablet Take 2,000 Units by mouth daily.     • betamethasone diprop (DIPROLENE AF) 0.05 % cream 1 topical application, twice daily to affected area(s) 30 g 0     ALLERGIES:   Allergen Reactions   • Mefoxin HIVES   • Mushroom HIVES   • Neomycin HIVES   • Nutrasweet Aspartame [Aspartame]      Migraines    • Penicillins HIVES   • Spinach HIVES, SWELLING and ANAPHYLAXIS   • Sulfa Antibiotics HIVES   • Tape [Adhesive] RASH   • Vancomycin ANAPHYLAXIS     Anaphylaxis NOS, due to adverse effect of correct medicinal substance prop    • Latex      Causes Eczema to flare up, Blisters    • Oxycontin HIVES   • Acetaminophen HIVES   • Codeine      Excessive drowsiness    • Morphine SWELLING   • Neosporin [Neomycin-Bacitracin-Polymyxin] HIVES   • Novocain Other (See  Comments)     \"does not work at all\" per pt   • Oxycodone      Excessive drowsiness    • Proair Hfa [Kdc:Albuterol]      Itchy mouth   • Savella Other (See Comments)           Visit Vitals   • /82 (BP Location: [facility], Patient Position: Sitting, Cuff Size: Large Adult)   • Pulse 78   • Temp 98.6 °F (37 °C) (Tympanic)   • Resp 16   • Ht 5' 3\" (1.6 m)   • Wt (!) 154.9 kg   • BMI 60.51 kg/m2     General appearance - alert and in no distress, seated in wheelchair, oxygen on  Ears - right tympanic membrane: negative, left tympanic membrane: negative  Nose - nares normal  Oropharynx - normal  Neck - supple  and no adenopathy  Lungs - clear to auscultation  Heart - normal, regular rate and rhythm, no murmur noted  Diabetic foot exam:   Left: Pulses 2+ (normal)trace pitting BLE (bilateral lower extremities) edema              Filament test normal               Ulcer/Callus none   Right: Pulses 2+ (normal)trace pitting BLE (bilateral lower extremities) edema              Filament test normal              Ulcer/Callus none    Raquel was seen today for diabetes.    Diagnoses and all orders for this visit:    Hypothyroidism (acquired)  -     Free T4; Future  -     Thyroid Stimulating Hormone; Future    Diabetes mellitus due to underlying condition without complication, with long-term current use of insulin  Comments:  off metformin, diarrhea resolved. RX for new glucose monitor  Orders:  -     Comprehensive Metabolic Panel; Future  -     Glycohemoglobin; Future  -     Lipid Panel with Reflex; Future  -     Microalbumin Urine Random; Future    Fibromyalgia  -     venlafaxine XR (EFFEXOR XR) 37.5 MG 24 hr capsule; Take 1 capsule by mouth daily.    Essential hypertension  Comments:  controlled    MORRO on CPAP  Comments:  using nightly  Orders:  -     SERVICE TO HOME CARE RESPIRATORY THERAPY      EDUCATION: I discussed the diagnoses and recommendations with the patient. The patient expressed understanding and is in agreement  with the plan.    Will wean down on Effexor.  Follow up 4 months , sooner if worse. To ER (Emergency Room) if symptoms worsen after Clinic hours.  CHRISSIE Gamez MD   Available

## 2024-12-05 NOTE — OB RN PATIENT PROFILE - PRIMARY CARE PROVIDER FOR, PROFILE
decreased ability to use legs for bridging/pushing/decreased ability to use arms for pushing/pulling/impaired ability to control trunk for mobility spouse

## 2024-12-05 NOTE — OB PROVIDER H&P - NSLOWPPHRISK_OBGYN_A_OB
No previous uterine incision/[place] Pregnancy/Less than or equal to 4 previous vaginal births/No known bleeding disorder/No history of postpartum hemorrhage/No other PPH risks indicated

## 2024-12-05 NOTE — OB RN PATIENT PROFILE - PATIENT'S GENDER IDENTITY

## 2024-12-05 NOTE — OB RN PATIENT PROFILE - SUICIDE SCREENING QUESTION 1
----- Message from Panda Epperson DO sent at 4/21/2023  6:53 PM EDT -----  Please call the patient with the biopsy results  Biopsies of stomach were benign and negative for Helicobacter pylori or for cancer 
LMOM
No
Abdominal Pain, N/V/D

## 2024-12-06 ENCOUNTER — TRANSCRIPTION ENCOUNTER (OUTPATIENT)
Age: 29
End: 2024-12-06

## 2024-12-06 ENCOUNTER — NON-APPOINTMENT (OUTPATIENT)
Age: 29
End: 2024-12-06

## 2024-12-06 LAB
CREAT SPEC-SCNC: 149 MG/DL
CREAT/PROT UR: 0.1 RATIO
PROT UR-MCNC: 20 MG/DL
T PALLIDUM AB TITR SER: NEGATIVE — SIGNIFICANT CHANGE UP

## 2024-12-06 PROCEDURE — 59025 FETAL NON-STRESS TEST: CPT | Mod: 26

## 2024-12-06 PROCEDURE — 59510 CESAREAN DELIVERY: CPT

## 2024-12-06 PROCEDURE — 59514 CESAREAN DELIVERY ONLY: CPT | Mod: AS,U9

## 2024-12-06 RX ORDER — BUTORPHANOL TARTRATE 2 MG/ML
0.25 INJECTION, SOLUTION INTRAMUSCULAR; INTRAVENOUS EVERY 6 HOURS
Refills: 0 | Status: DISCONTINUED | OUTPATIENT
Start: 2024-12-06 | End: 2024-12-07

## 2024-12-06 RX ORDER — 0.9 % SODIUM CHLORIDE 0.9 %
1000 INTRAVENOUS SOLUTION INTRAVENOUS ONCE
Refills: 0 | Status: DISCONTINUED | OUTPATIENT
Start: 2024-12-06 | End: 2024-12-07

## 2024-12-06 RX ORDER — ACETAMINOPHEN 500MG 500 MG/1
975 TABLET, COATED ORAL
Refills: 0 | Status: DISCONTINUED | OUTPATIENT
Start: 2024-12-06 | End: 2024-12-08

## 2024-12-06 RX ORDER — ACETAMINOPHEN 500MG 500 MG/1
1000 TABLET, COATED ORAL ONCE
Refills: 0 | Status: COMPLETED | OUTPATIENT
Start: 2024-12-06 | End: 2024-12-06

## 2024-12-06 RX ORDER — LANOLIN 72 %
1 OINTMENT (GRAM) TOPICAL EVERY 6 HOURS
Refills: 0 | Status: DISCONTINUED | OUTPATIENT
Start: 2024-12-06 | End: 2024-12-08

## 2024-12-06 RX ORDER — OXYCODONE HYDROCHLORIDE 30 MG/1
5 TABLET ORAL
Refills: 0 | Status: DISCONTINUED | OUTPATIENT
Start: 2024-12-06 | End: 2024-12-08

## 2024-12-06 RX ORDER — KETOROLAC TROMETHAMINE 30 MG/ML
30 INJECTION INTRAMUSCULAR; INTRAVENOUS EVERY 6 HOURS
Refills: 0 | Status: DISCONTINUED | OUTPATIENT
Start: 2024-12-06 | End: 2024-12-07

## 2024-12-06 RX ORDER — 0.9 % SODIUM CHLORIDE 0.9 %
500 INTRAVENOUS SOLUTION INTRAVENOUS ONCE
Refills: 0 | Status: DISCONTINUED | OUTPATIENT
Start: 2024-12-06 | End: 2024-12-07

## 2024-12-06 RX ORDER — IBUPROFEN 200 MG
600 TABLET ORAL EVERY 6 HOURS
Refills: 0 | Status: COMPLETED | OUTPATIENT
Start: 2024-12-06 | End: 2025-11-04

## 2024-12-06 RX ORDER — 0.9 % SODIUM CHLORIDE 0.9 %
1000 INTRAVENOUS SOLUTION INTRAVENOUS
Refills: 0 | Status: DISCONTINUED | OUTPATIENT
Start: 2024-12-06 | End: 2024-12-07

## 2024-12-06 RX ORDER — DEXAMETHASONE 1.5 MG/1
4 TABLET ORAL EVERY 6 HOURS
Refills: 0 | Status: DISCONTINUED | OUTPATIENT
Start: 2024-12-06 | End: 2024-12-07

## 2024-12-06 RX ORDER — NALOXONE HCL 0.4 MG/ML
0.1 AMPUL (ML) INJECTION
Refills: 0 | Status: DISCONTINUED | OUTPATIENT
Start: 2024-12-06 | End: 2024-12-07

## 2024-12-06 RX ORDER — TRISODIUM CITRATE DIHYDRATE AND CITRIC ACID MONOHYDRATE 500; 334 MG/5ML; MG/5ML
30 SOLUTION ORAL ONCE
Refills: 0 | Status: COMPLETED | OUTPATIENT
Start: 2024-12-06 | End: 2024-12-06

## 2024-12-06 RX ORDER — SIMETHICONE 125 MG
80 CAPSULE ORAL EVERY 4 HOURS
Refills: 0 | Status: DISCONTINUED | OUTPATIENT
Start: 2024-12-06 | End: 2024-12-08

## 2024-12-06 RX ORDER — DIPHENHYDRAMINE HCL 25 MG
25 CAPSULE ORAL EVERY 6 HOURS
Refills: 0 | Status: DISCONTINUED | OUTPATIENT
Start: 2024-12-06 | End: 2024-12-08

## 2024-12-06 RX ORDER — OXYCODONE HYDROCHLORIDE 30 MG/1
5 TABLET ORAL ONCE
Refills: 0 | Status: DISCONTINUED | OUTPATIENT
Start: 2024-12-06 | End: 2024-12-08

## 2024-12-06 RX ORDER — 0.9 % SODIUM CHLORIDE 0.9 %
1000 INTRAVENOUS SOLUTION INTRAVENOUS ONCE
Refills: 0 | Status: COMPLETED | OUTPATIENT
Start: 2024-12-06 | End: 2024-12-06

## 2024-12-06 RX ORDER — NALBUPHINE HYDROCHLORIDE 10 MG/ML
2.5 INJECTION INTRAMUSCULAR; INTRAVENOUS; SUBCUTANEOUS EVERY 6 HOURS
Refills: 0 | Status: DISCONTINUED | OUTPATIENT
Start: 2024-12-06 | End: 2024-12-07

## 2024-12-06 RX ORDER — METOCLOPRAMIDE HYDROCHLORIDE 10 MG/1
10 TABLET ORAL ONCE
Refills: 0 | Status: COMPLETED | OUTPATIENT
Start: 2024-12-06 | End: 2024-12-06

## 2024-12-06 RX ORDER — ONDANSETRON HYDROCHLORIDE 4 MG/1
4 TABLET, FILM COATED ORAL EVERY 6 HOURS
Refills: 0 | Status: DISCONTINUED | OUTPATIENT
Start: 2024-12-06 | End: 2024-12-07

## 2024-12-06 RX ORDER — OXYCODONE HYDROCHLORIDE 30 MG/1
10 TABLET ORAL
Refills: 0 | Status: DISCONTINUED | OUTPATIENT
Start: 2024-12-06 | End: 2024-12-07

## 2024-12-06 RX ORDER — HEPARIN SODIUM,PORCINE 1000/ML
5000 VIAL (ML) INJECTION EVERY 12 HOURS
Refills: 0 | Status: DISCONTINUED | OUTPATIENT
Start: 2024-12-06 | End: 2024-12-08

## 2024-12-06 RX ORDER — TETANUS TOXOID, REDUCED DIPHTHERIA TOXOID AND ACELLULAR PERTUSSIS VACCINE, ADSORBED 5; 2.5; 8; 8; 2.5 [IU]/.5ML; [IU]/.5ML; UG/.5ML; UG/.5ML; UG/.5ML
0.5 SUSPENSION INTRAMUSCULAR ONCE
Refills: 0 | Status: DISCONTINUED | OUTPATIENT
Start: 2024-12-06 | End: 2024-12-08

## 2024-12-06 RX ORDER — OXYCODONE HYDROCHLORIDE 30 MG/1
5 TABLET ORAL
Refills: 0 | Status: DISCONTINUED | OUTPATIENT
Start: 2024-12-06 | End: 2024-12-07

## 2024-12-06 RX ORDER — FAMOTIDINE 20 MG/1
20 TABLET, FILM COATED ORAL ONCE
Refills: 0 | Status: COMPLETED | OUTPATIENT
Start: 2024-12-06 | End: 2024-12-06

## 2024-12-06 RX ADMIN — Medication 5000 UNIT(S): at 19:36

## 2024-12-06 RX ADMIN — Medication 167 MILLIUNIT(S)/MIN: at 14:18

## 2024-12-06 RX ADMIN — TRISODIUM CITRATE DIHYDRATE AND CITRIC ACID MONOHYDRATE 30 MILLILITER(S): 500; 334 SOLUTION ORAL at 12:08

## 2024-12-06 RX ADMIN — METOCLOPRAMIDE HYDROCHLORIDE 10 MILLIGRAM(S): 10 TABLET ORAL at 16:57

## 2024-12-06 RX ADMIN — ACETAMINOPHEN 500MG 400 MILLIGRAM(S): 500 TABLET, COATED ORAL at 17:10

## 2024-12-06 RX ADMIN — ONDANSETRON HYDROCHLORIDE 4 MILLIGRAM(S): 4 TABLET, FILM COATED ORAL at 14:39

## 2024-12-06 RX ADMIN — Medication 125 MILLILITER(S): at 14:18

## 2024-12-06 RX ADMIN — FAMOTIDINE 20 MILLIGRAM(S): 20 TABLET, FILM COATED ORAL at 12:07

## 2024-12-06 RX ADMIN — Medication 1000 MILLILITER(S): at 14:15

## 2024-12-06 RX ADMIN — CHLORHEXIDINE GLUCONATE 1 APPLICATION(S): 1.2 RINSE ORAL at 12:10

## 2024-12-06 RX ADMIN — TRISODIUM CITRATE DIHYDRATE AND CITRIC ACID MONOHYDRATE 15 MILLILITER(S): 500; 334 SOLUTION ORAL at 12:07

## 2024-12-06 RX ADMIN — ACETAMINOPHEN 500MG 1000 MILLIGRAM(S): 500 TABLET, COATED ORAL at 17:40

## 2024-12-06 NOTE — CHART NOTE - NSCHARTNOTEFT_GEN_A_CORE
Patient unknown to me prior to today.     Assumed care of patient at change of shift this AM. Notified that patient was admitted and requesting primary c section. Agree that delivery of this patient is indicated at this time due to diagnosis of gHTN at 39wks GA.     Patient initially presented for IOL and endorsed that she had discussed primary elective  with her primary OB which was not documented in the outpatient record. I contacted Dr. Wright and Dr. Wright called the patient to perform counseling on risks and benefits of primary  section as compared to IOL. Requested for Dr. Wright to document the details of this counseling. Following this conversation, patient decided on primary  section as mode of delivery.     Patient discussed with Dr. Wright. I saw the patient at bedside and performed standard  counseling including risks, benefits, and alternatives and signed surgical consents with her. Will proceed with primary  section.     Robbin PINEDA

## 2024-12-06 NOTE — OB RN DELIVERY SUMMARY - NS_SEPSISRSKCALC_OBGYN_ALL_OB_FT
EOS calculated successfully. EOS Risk Factor: 0.5/1000 live births ([facility] national incidence); GA=39w4d; Temp=98.24; ROM=0.017; GBS='Positive'; Antibiotics='No antibiotics or any antibiotics < 2 hrs prior to birth'

## 2024-12-06 NOTE — OB RN DELIVERY SUMMARY - NSSELHIDDEN_OBGYN_ALL_OB_FT
[NS_DeliveryAttending1_OBGYN_ALL_OB_FT:[id number]=],[NS_DeliveryAssist1_OBGYN_ALL_OB_FT:[id number]==],[NS_DeliveryRN_OBGYN_ALL_OB_FT:[id number]]

## 2024-12-06 NOTE — OB PROVIDER DELIVERY SUMMARY - NSLOWPPHRISK_OBGYN_A_OB
[de-identified] : recent audiometry reviewed. No previous uterine incision/[place] Pregnancy/Less than or equal to 4 previous vaginal births/No known bleeding disorder/No history of postpartum hemorrhage/No other PPH risks indicated

## 2024-12-06 NOTE — BRIEF OPERATIVE NOTE - NSICDXBRIEFPOSTOP_GEN_ALL_CORE_FT
POST-OP DIAGNOSIS:  Delivery by  section using transverse incision of lower segment of uterus 06-Dec-2024 15:39:25  Rowan Albright

## 2024-12-06 NOTE — BRIEF OPERATIVE NOTE - OPERATION/FINDINGS
unscheduled primary elective  section   Viable female infant, Apgar 9/9, weight 2810 g  Hysterotomy closed in 1 layer using Caprosyn suture  Fascia was closed using 0-Vicryl suture  Grossly normal uterus, tubes, and ovaries  Subcutaneous tissue was reapproximated in running fashion using plain gut  deep dermal layer using plain gut   Skin was closed in subcuticular fashion using Monocryl suture  Prineo dressing in place  Patient and infant to recovery in stable condition    QBL: 240 cc  IVF: 1700 cc    UOP: 50  dictation #[id number]    Rowan Albright CNM

## 2024-12-06 NOTE — OB PROVIDER DELIVERY SUMMARY - NSSELHIDDEN_OBGYN_ALL_OB_FT
[NS_DeliveryAttending1_OBGYN_ALL_OB_FT:[id number]],[NS_DeliveryAssist1_OBGYN_ALL_OB_FT:[id number]],[NS_DeliveryRN_OBGYN_ALL_OB_FT:[id number]]

## 2024-12-06 NOTE — OB RN INTRAOPERATIVE NOTE - NSSELHIDDEN_OBGYN_ALL_OB_FT
[NS_DeliveryAttending1_OBGYN_ALL_OB_FT:[id number]] [NS_DeliveryAttending1_OBGYN_ALL_OB_FT:[id number]=],[NS_DeliveryAssist1_OBGYN_ALL_OB_FT:[id number]==],[NS_DeliveryRN_OBGYN_ALL_OB_FT:[id number]]

## 2024-12-07 LAB
BASOPHILS # BLD AUTO: 0.02 K/UL — SIGNIFICANT CHANGE UP (ref 0–0.2)
BASOPHILS NFR BLD AUTO: 0.1 % — SIGNIFICANT CHANGE UP (ref 0–2)
EOSINOPHIL # BLD AUTO: 0.01 K/UL — SIGNIFICANT CHANGE UP (ref 0–0.5)
EOSINOPHIL NFR BLD AUTO: 0.1 % — SIGNIFICANT CHANGE UP (ref 0–6)
HCT VFR BLD CALC: 30 % — LOW (ref 34.5–45)
HGB BLD-MCNC: 10 G/DL — LOW (ref 11.5–15.5)
IANC: 9.88 K/UL — HIGH (ref 1.8–7.4)
IMM GRANULOCYTES NFR BLD AUTO: 0.7 % — SIGNIFICANT CHANGE UP (ref 0–0.9)
LYMPHOCYTES # BLD AUTO: 19 % — SIGNIFICANT CHANGE UP (ref 13–44)
LYMPHOCYTES # BLD AUTO: 2.55 K/UL — SIGNIFICANT CHANGE UP (ref 1–3.3)
MCHC RBC-ENTMCNC: 30.6 PG — SIGNIFICANT CHANGE UP (ref 27–34)
MCHC RBC-ENTMCNC: 33.3 G/DL — SIGNIFICANT CHANGE UP (ref 32–36)
MCV RBC AUTO: 91.7 FL — SIGNIFICANT CHANGE UP (ref 80–100)
MONOCYTES # BLD AUTO: 0.87 K/UL — SIGNIFICANT CHANGE UP (ref 0–0.9)
MONOCYTES NFR BLD AUTO: 6.5 % — SIGNIFICANT CHANGE UP (ref 2–14)
NEUTROPHILS # BLD AUTO: 9.88 K/UL — HIGH (ref 1.8–7.4)
NEUTROPHILS NFR BLD AUTO: 73.6 % — SIGNIFICANT CHANGE UP (ref 43–77)
NRBC # BLD: 0 /100 WBCS — SIGNIFICANT CHANGE UP (ref 0–0)
NRBC # FLD: 0 K/UL — SIGNIFICANT CHANGE UP (ref 0–0)
PLATELET # BLD AUTO: 242 K/UL — SIGNIFICANT CHANGE UP (ref 150–400)
RBC # BLD: 3.27 M/UL — LOW (ref 3.8–5.2)
RBC # FLD: 13.2 % — SIGNIFICANT CHANGE UP (ref 10.3–14.5)
WBC # BLD: 13.42 K/UL — HIGH (ref 3.8–10.5)
WBC # FLD AUTO: 13.42 K/UL — HIGH (ref 3.8–10.5)

## 2024-12-07 RX ORDER — SENNOSIDES 8.6 MG
2 TABLET ORAL AT BEDTIME
Refills: 0 | Status: DISCONTINUED | OUTPATIENT
Start: 2024-12-07 | End: 2024-12-08

## 2024-12-07 RX ORDER — IBUPROFEN 200 MG
600 TABLET ORAL EVERY 6 HOURS
Refills: 0 | Status: DISCONTINUED | OUTPATIENT
Start: 2024-12-07 | End: 2024-12-08

## 2024-12-07 RX ORDER — POLYETHYLENE GLYCOL 3350 17 G/17G
17 POWDER, FOR SOLUTION ORAL DAILY
Refills: 0 | Status: DISCONTINUED | OUTPATIENT
Start: 2024-12-07 | End: 2024-12-08

## 2024-12-07 RX ADMIN — ACETAMINOPHEN 500MG 975 MILLIGRAM(S): 500 TABLET, COATED ORAL at 10:00

## 2024-12-07 RX ADMIN — ACETAMINOPHEN 500MG 975 MILLIGRAM(S): 500 TABLET, COATED ORAL at 04:33

## 2024-12-07 RX ADMIN — KETOROLAC TROMETHAMINE 30 MILLIGRAM(S): 30 INJECTION INTRAMUSCULAR; INTRAVENOUS at 05:23

## 2024-12-07 RX ADMIN — Medication 5000 UNIT(S): at 17:54

## 2024-12-07 RX ADMIN — KETOROLAC TROMETHAMINE 30 MILLIGRAM(S): 30 INJECTION INTRAMUSCULAR; INTRAVENOUS at 00:26

## 2024-12-07 RX ADMIN — Medication 5000 UNIT(S): at 05:23

## 2024-12-07 RX ADMIN — Medication 600 MILLIGRAM(S): at 18:30

## 2024-12-07 RX ADMIN — KETOROLAC TROMETHAMINE 30 MILLIGRAM(S): 30 INJECTION INTRAMUSCULAR; INTRAVENOUS at 00:11

## 2024-12-07 RX ADMIN — KETOROLAC TROMETHAMINE 30 MILLIGRAM(S): 30 INJECTION INTRAMUSCULAR; INTRAVENOUS at 13:00

## 2024-12-07 RX ADMIN — KETOROLAC TROMETHAMINE 30 MILLIGRAM(S): 30 INJECTION INTRAMUSCULAR; INTRAVENOUS at 05:38

## 2024-12-07 RX ADMIN — Medication 600 MILLIGRAM(S): at 17:54

## 2024-12-07 RX ADMIN — Medication 600 MILLIGRAM(S): at 23:38

## 2024-12-07 RX ADMIN — ACETAMINOPHEN 500MG 975 MILLIGRAM(S): 500 TABLET, COATED ORAL at 20:20

## 2024-12-07 RX ADMIN — ACETAMINOPHEN 500MG 975 MILLIGRAM(S): 500 TABLET, COATED ORAL at 03:17

## 2024-12-07 RX ADMIN — ACETAMINOPHEN 500MG 975 MILLIGRAM(S): 500 TABLET, COATED ORAL at 15:35

## 2024-12-07 RX ADMIN — ACETAMINOPHEN 500MG 975 MILLIGRAM(S): 500 TABLET, COATED ORAL at 09:20

## 2024-12-07 RX ADMIN — KETOROLAC TROMETHAMINE 30 MILLIGRAM(S): 30 INJECTION INTRAMUSCULAR; INTRAVENOUS at 12:30

## 2024-12-07 RX ADMIN — ACETAMINOPHEN 500MG 975 MILLIGRAM(S): 500 TABLET, COATED ORAL at 21:30

## 2024-12-07 RX ADMIN — Medication 2 TABLET(S): at 23:41

## 2024-12-07 RX ADMIN — ACETAMINOPHEN 500MG 975 MILLIGRAM(S): 500 TABLET, COATED ORAL at 16:00

## 2024-12-07 NOTE — PROGRESS NOTE ADULT - SUBJECTIVE AND OBJECTIVE BOX
30yo POD#1 s/p pLTCS course c/b gHTN . Her pain is well controlled. She is tolerating a regular diet. Denies flatus. Denies N/V. Denies CP/SOB/lightheadedness/dizziness. She is ambulating without difficulty. Voiding spontaneously.     O:   Vital Signs Last 24 Hrs  T(C): 36.9 (07 Dec 2024 06:05), Max: 37.1 (06 Dec 2024 22:23)  T(F): 98.5 (07 Dec 2024 06:05), Max: 98.7 (06 Dec 2024 22:23)  HR: 66 (07 Dec 2024 06:05) (54 - 113)  BP: 116/63 (07 Dec 2024 06:05) (109/91 - 140/71)  BP(mean): 75 (07 Dec 2024 02:27) (75 - 104)  RR: 17 (07 Dec 2024 06:05) (10 - 23)  SpO2: 98% (07 Dec 2024 06:05) (97% - 100%)    Parameters below as of 07 Dec 2024 06:05  Patient On (Oxygen Delivery Method): room air        MEDICATIONS  (STANDING):  acetaminophen     Tablet .. 975 milliGRAM(s) Oral <User Schedule>  diphtheria/tetanus/pertussis (acellular) Vaccine (Adacel) 0.5 milliLiter(s) IntraMuscular once  heparin   Injectable 5000 Unit(s) SubCutaneous every 12 hours  ibuprofen  Tablet. 600 milliGRAM(s) Oral every 6 hours  ketorolac   Injectable 30 milliGRAM(s) IV Push every 6 hours  lactated ringers. 1000 milliLiter(s) (125 mL/Hr) IV Continuous <Continuous>  lactated ringers. 1000 milliLiter(s) (83 mL/Hr) IV Continuous <Continuous>  morphine PF Spinal 0.1 milliGRAM(s) IntraThecal. once  oxytocin Infusion 42 milliUNIT(s)/Min (42 mL/Hr) IV Continuous <Continuous>      MEDICATIONS  (PRN):  butorphanol Injectable 0.25 milliGRAM(s) IV Push every 6 hours PRN Pruritus  dexAMETHasone  Injectable 4 milliGRAM(s) IV Push every 6 hours PRN Nausea  diphenhydrAMINE 25 milliGRAM(s) Oral every 6 hours PRN Pruritus  lanolin Ointment 1 Application(s) Topical every 6 hours PRN Sore Nipples  magnesium hydroxide Suspension 30 milliLiter(s) Oral two times a day PRN Constipation  nalbuphine Injectable 2.5 milliGRAM(s) IV Push every 6 hours PRN Pruritus  naloxone Injectable 0.1 milliGRAM(s) IV Push every 3 minutes PRN For ANY of the following changes in patient status:  A. Breaths Per Minute LESS THAN 10, B. Oxygen saturation LESS THAN 90%, C. Sedation score of 6 for Stop After: 4 Times  ondansetron Injectable 4 milliGRAM(s) IV Push every 6 hours PRN Nausea  oxyCODONE    IR 5 milliGRAM(s) Oral every 3 hours PRN Mild Pain (1 - 3)  oxyCODONE    IR 10 milliGRAM(s) Oral every 3 hours PRN Moderate Pain (4 - 6)  oxyCODONE    IR 5 milliGRAM(s) Oral every 3 hours PRN Moderate to Severe Pain (4-10)  oxyCODONE    IR 5 milliGRAM(s) Oral once PRN Moderate to Severe Pain (4-10)  simethicone 80 milliGRAM(s) Chew every 4 hours PRN Gas        Labs:  Blood type: O Positive  Rubella IgG: RPR: Negative                          10.0[L]   13.42[H] >-----------< 242    ( 12-07 @ 05:35 )             30.0[L]                        11.7   10.98[H] >-----------< 295    ( 12-05 @ 21:55 )             35.2    12-05-24 @ 21:55      137  |  103  |  8   ----------------------------<  82  3.9   |  17[L]  |  0.62        Ca    9.6      05 Dec 2024 21:55    TPro  7.1  /  Alb  3.8  /  TBili  <0.2  /  DBili  x   /  AST  18  /  ALT  11  /  AlkPhos  142[H]  12-05-24 @ 21:55          PE:  General: NAD  Abdomen: Mildly distended, appropriately tender, incision c/d/i.  Extremities: No erythema, no pitting edema

## 2024-12-07 NOTE — PROGRESS NOTE ADULT - SUBJECTIVE AND OBJECTIVE BOX
POST OP DAY  1  s/p   SECTION    SUBJECTIVE: Doing ok    PAIN SCALE SCORE: [x] Refer to charted pain scores    THERAPY:  [ x] Spinal morphine   [  ] Epidural morphine   [  ] IV PCA Hydromorphone 1 mg/ml    OBJECTIVE:   Vital Signs Last 24 Hrs  T(C): 36.4 (07 Dec 2024 10:21), Max: 37.1 (06 Dec 2024 22:23)  T(F): 97.5 (07 Dec 2024 10:21), Max: 98.7 (06 Dec 2024 22:23)  HR: 72 (07 Dec 2024 10:21) (54 - 113)  BP: 121/65 (07 Dec 2024 10:21) (109/91 - 138/61)  BP(mean): 75 (07 Dec 2024 02:27) (75 - 104)  RR: 17 (07 Dec 2024 10:21) (10 - 23)  SpO2: 99% (07 Dec 2024 10:21) (97% - 100%)    Parameters below as of 07 Dec 2024 10:21  Patient On (Oxygen Delivery Method): room air                          10.0   13.42 )-----------( 242      ( 07 Dec 2024 05:35 )             30.0       12-05    137  |  103  |  8   ----------------------------<  82  3.9   |  17[L]  |  0.62    Ca    9.6      05 Dec 2024 21:55    TPro  7.1  /  Alb  3.8  /  TBili  <0.2  /  DBili  x   /  AST  18  /  ALT  11  /  AlkPhos  142[H]  12-05      SEDATION SCORE:	  [ x ] Alert	    [  ] Drowsy        [  ] Arousable	[  ] Asleep	[  ] Unresponsive    Side Effects:	  [ x ] None	     [  ] Nausea        [  ] Pruritus        [  ] Weakness   [  ] Numbness        ASSESSMENT/ PLAN   [   ] Discontinue         [  ] Continue    [ x ] Change to prn Analgesics as per primary service.    DOCUMENTATION & VERIFICATION OF CURRENT MEDS [ x ] Done    COMMENTS: No Headache.

## 2024-12-07 NOTE — PROGRESS NOTE ADULT - ATTENDING COMMENTS
Patient seen and evaluated at bedside. Recovering well. Continue routine postpartum care.    Robbin PINEDA

## 2024-12-07 NOTE — PROGRESS NOTE ADULT - ASSESSMENT
28yo POD#1 s/p pLTCS.  Patient is stable and doing well post-operatively.      #gHTN  -cw BP monitoring, BP: 116/63 (12-07-24 @ 06:05) (116/63 - 138/61)  -Pt not on medication  -No PEC symptoms    #Postop from LTCS  - Continue regular diet.  - Increase ambulation.  - Continue current pain regimen  - F/u AM CBC    Yue Sunshine, PGY-1 Chief Complaint   Patient presents with   • Dental Problem     pt c/o right side jaw/ear pain for approx 5-6 days         HPI:  Deja is a 41 year old year old female who presents with a 5 day h/o ear pain and right pre auricular pain . with  a previous history of ear infections. Associated symptoms include pain only . There has either been no use or no improvement from the use of OTC medications.  Denies fevers, dyspnea, cough, sore throat.  Has sinus, congestion and drainage with recent weather changes that is clear mucus.    ALLERGIES:  Methocarbamol; Tramadol; and Vicodin [hydrocodone-acetaminophen]     ROS: Negative with the exceptions listed above    Vitals:    08/29/18 0854   BP: 118/72   Pulse: 90   Resp: 18   Temp: 98.2 °F (36.8 °C)     PHYSICAL EXAMINATION:  GEN: Afebrile, NAD  EYE: PERRLA, EOMI, no conjunctival injection  EAR: Right - TM red, bulging, no landmarks or light reflex visualized and Left - canals clear and TM w/ nl landmarks, light reflex and mobility  THROAT: mucous membranes moist, normal tonsils w/o erythema, exudates or petechia and mmm, normal tonsils w/o erythema, exudates or petechia  NOSE: clear drainage with erythematous nasal turbinates  NECK: normal, supple and no lymphadenopathy; no TMJ tenderness        A/P:  1. Right otitis media, unspecified otitis media type  2. Right eustachian dysfunction   - azithromycin (ZITHROMAX Z-SRINIVASAN) 250 MG tablet; As directed on package label  Dispense: 6 tablet; Refill: 0  - Recommend Tylenol or Motrin for pain and/or fever> 101deg F. Weight based dosage discussed with parent.  - has meclizine 25 mg to take HS if any vertigo  To call or return sooner if earache is persistent, child has continued fever, poor appetite, excessive irritability, appears lethargic.  Indications to call or return discussed with accompanying parent in detail.    Dennis Reece,

## 2024-12-08 VITALS
SYSTOLIC BLOOD PRESSURE: 121 MMHG | DIASTOLIC BLOOD PRESSURE: 69 MMHG | OXYGEN SATURATION: 100 % | RESPIRATION RATE: 18 BRPM | HEART RATE: 82 BPM | TEMPERATURE: 98 F

## 2024-12-08 RX ORDER — CHOLECALCIFEROL (VITAMIN D3) 10MCG/0.25
1 DROPS ORAL
Refills: 0 | DISCHARGE

## 2024-12-08 RX ORDER — MULTIVIT WITH MINERALS/LUTEIN
1 TABLET ORAL
Refills: 0 | DISCHARGE

## 2024-12-08 RX ORDER — ACETAMINOPHEN 500MG 500 MG/1
3 TABLET, COATED ORAL
Qty: 0 | Refills: 0 | DISCHARGE
Start: 2024-12-08

## 2024-12-08 RX ORDER — IBUPROFEN 200 MG
1 TABLET ORAL
Qty: 0 | Refills: 0 | DISCHARGE
Start: 2024-12-08

## 2024-12-08 RX ADMIN — ACETAMINOPHEN 500MG 975 MILLIGRAM(S): 500 TABLET, COATED ORAL at 03:30

## 2024-12-08 RX ADMIN — ACETAMINOPHEN 500MG 975 MILLIGRAM(S): 500 TABLET, COATED ORAL at 09:45

## 2024-12-08 RX ADMIN — ACETAMINOPHEN 500MG 975 MILLIGRAM(S): 500 TABLET, COATED ORAL at 09:12

## 2024-12-08 RX ADMIN — Medication 30 MILLILITER(S): at 05:45

## 2024-12-08 RX ADMIN — Medication 600 MILLIGRAM(S): at 05:20

## 2024-12-08 RX ADMIN — ACETAMINOPHEN 500MG 975 MILLIGRAM(S): 500 TABLET, COATED ORAL at 02:22

## 2024-12-08 RX ADMIN — Medication 5000 UNIT(S): at 05:21

## 2024-12-08 RX ADMIN — Medication 600 MILLIGRAM(S): at 00:09

## 2024-12-08 RX ADMIN — Medication 600 MILLIGRAM(S): at 06:37

## 2024-12-08 NOTE — PROGRESS NOTE ADULT - SUBJECTIVE AND OBJECTIVE BOX
Pt without complaints +flatus , denies excessive bleeding  Vital Signs Last 24 Hrs  T(C): 36.7 (08 Dec 2024 09:49), Max: 37.2 (08 Dec 2024 06:00)  T(F): 98.1 (08 Dec 2024 09:49), Max: 99 (08 Dec 2024 06:00)  HR: 77 (08 Dec 2024 09:49) (76 - 84)  BP: 123/74 (08 Dec 2024 09:49) (115/50 - 124/67)  BP(mean): --  RR: 17 (08 Dec 2024 09:49) (16 - 18)  SpO2: 100% (08 Dec 2024 09:49) (99% - 100%)    Parameters below as of 08 Dec 2024 09:49  Patient On (Oxygen Delivery Method): room air    Blood Type: O Positive      abdomen - soft, appropriately tender, non-distended  incision - clean, dry, intact  extremeties - nontender    s/p c/s  doing well  desires dc home   Pt without complaints +flatus , denies excessive bleeding  Vital Signs Last 24 Hrs  T(C): 36.7 (08 Dec 2024 09:49), Max: 37.2 (08 Dec 2024 06:00)  T(F): 98.1 (08 Dec 2024 09:49), Max: 99 (08 Dec 2024 06:00)  HR: 77 (08 Dec 2024 09:49) (76 - 84)  BP: 123/74 (08 Dec 2024 09:49) (115/50 - 124/67)  BP(mean): --  RR: 17 (08 Dec 2024 09:49) (16 - 18)  SpO2: 100% (08 Dec 2024 09:49) (99% - 100%)    Parameters below as of 08 Dec 2024 09:49  Patient On (Oxygen Delivery Method): room air    Blood Type: O Positive      abdomen - soft, appropriately tender, non-distended  incision - clean, dry, intact  extremeties - nontender    s/p c/s, gHTN  blood pressures well controlled  doing well  desires dc home  advised to monitor BPs at home. Advised to call if > 140 or > 90. Return to hospital if > or = 160/110  Follow up with Dr Wright one week

## 2024-12-08 NOTE — DISCHARGE NOTE OB - MEDICATION SUMMARY - MEDICATIONS TO TAKE
I will START or STAY ON the medications listed below when I get home from the hospital:    ibuprofen 600 mg oral tablet  -- 1 tab(s) by mouth every 6 hours  -- Indication: For  section    acetaminophen 325 mg oral tablet  -- 3 tab(s) by mouth every 8 hours  -- Indication: For  section    PNV Prenatal oral tablet  -- 1 tab(s) by mouth once a day  -- Indication: For  section

## 2024-12-08 NOTE — DISCHARGE NOTE OB - CARE PROVIDER_API CALL
Adelina Wright  Obstetrics and Gynecology  [facility] [place] [address]  Phone: [phone]  Fax: [phone]  Follow Up Time:

## 2024-12-08 NOTE — DISCHARGE NOTE OB - MEDICATION SUMMARY - MEDICATIONS TO STOP TAKING
I will STOP taking the medications listed below when I get home from the hospital:    Vitamin C 1000 mg oral tablet  -- 1 tab(s) by mouth once a day    cyclobenzaprine 5 mg oral tablet  -- 1 tab(s) by mouth 3 times a day   -- Some non-prescription drugs may aggravate your condition.  Read all labels carefully.  If a warning appears, check with your doctor before taking.  This drug may impair the ability to drive or operate machinery.  Use care until you become familiar with its effects.    Vitamin D3 25 mcg (1000 intl units) oral tablet  -- 1 tab(s) by mouth once a day

## 2024-12-08 NOTE — DISCHARGE NOTE OB - PATIENT PORTAL LINK FT
You can access the FollowMyHealth Patient Portal offered by Health system by registering at the following website: [email or web address]. By joining [employer]’s FollowMyHealth portal, you will also be able to view your health information using other applications (apps) compatible with our system.

## 2024-12-08 NOTE — DISCHARGE NOTE OB - CARE PLAN
Principal Discharge DX:	 delivery delivered  Assessment and plan of treatment:	no heavy lifting or exercise x 6 weeks  follow up with Dr Wright one week  monitor BPs at home. Call offfice if systolic BP > 140 or diastolic BP >90. Return to hospital if systolic BP > or = 160 or if diastolic BP > or = 110  call if headache unresolved with tylenol, epigastric pain, blurry vision.   1

## 2024-12-08 NOTE — DISCHARGE NOTE OB - FINANCIAL ASSISTANCE
[facility] provides services at a reduced cost to those who are determined to be eligible through [facility]’s financial assistance program. Information regarding [facility]’s financial assistance program can be found by going to [email or web address] or by calling [phone].

## 2024-12-08 NOTE — DISCHARGE NOTE OB - NS MD DC FALL RISK RISK
For information on Fall & Injury Prevention, visit: [email or web address] OR  [email or web address] OR  https://www.cdc.gov/steadi/patient.html

## 2024-12-08 NOTE — DISCHARGE NOTE OB - PLAN OF CARE
no heavy lifting or exercise x 6 weeks  follow up with Dr Wright one week  monitor BPs at home. Call offfice if systolic BP > 140 or diastolic BP >90. Return to hospital if systolic BP > or = 160 or if diastolic BP > or = 110  call if headache unresolved with tylenol, epigastric pain, blurry vision.

## 2024-12-09 ENCOUNTER — NON-APPOINTMENT (OUTPATIENT)
Age: 29
End: 2024-12-09

## 2024-12-09 ENCOUNTER — APPOINTMENT (OUTPATIENT)
Dept: OBGYN | Facility: CLINIC | Age: 29
End: 2024-12-09

## 2024-12-10 PROBLEM — N83.209 UNSPECIFIED OVARIAN CYST, UNSPECIFIED SIDE: Chronic | Status: ACTIVE | Noted: 2024-12-05

## 2024-12-11 ENCOUNTER — NON-APPOINTMENT (OUTPATIENT)
Age: 29
End: 2024-12-11

## 2024-12-13 ENCOUNTER — NON-APPOINTMENT (OUTPATIENT)
Age: 29
End: 2024-12-13

## 2024-12-13 ENCOUNTER — APPOINTMENT (OUTPATIENT)
Dept: OBGYN | Facility: CLINIC | Age: 29
End: 2024-12-13
Payer: COMMERCIAL

## 2024-12-13 VITALS — SYSTOLIC BLOOD PRESSURE: 139 MMHG | DIASTOLIC BLOOD PRESSURE: 83 MMHG

## 2024-12-13 VITALS — SYSTOLIC BLOOD PRESSURE: 142 MMHG | DIASTOLIC BLOOD PRESSURE: 86 MMHG

## 2024-12-13 VITALS — DIASTOLIC BLOOD PRESSURE: 78 MMHG | SYSTOLIC BLOOD PRESSURE: 161 MMHG

## 2024-12-13 PROCEDURE — ZZZZZ: CPT

## 2024-12-17 ENCOUNTER — NON-APPOINTMENT (OUTPATIENT)
Age: 29
End: 2024-12-17

## 2024-12-17 ENCOUNTER — APPOINTMENT (OUTPATIENT)
Dept: CARDIOLOGY | Facility: CLINIC | Age: 29
End: 2024-12-17
Payer: COMMERCIAL

## 2024-12-17 VITALS
SYSTOLIC BLOOD PRESSURE: 120 MMHG | HEIGHT: 64 IN | OXYGEN SATURATION: 99 % | DIASTOLIC BLOOD PRESSURE: 68 MMHG | BODY MASS INDEX: 23.9 KG/M2 | WEIGHT: 140 LBS | HEART RATE: 76 BPM

## 2024-12-17 VITALS — SYSTOLIC BLOOD PRESSURE: 128 MMHG | DIASTOLIC BLOOD PRESSURE: 70 MMHG

## 2024-12-17 DIAGNOSIS — R09.89 OTHER SPECIFIED SYMPTOMS AND SIGNS INVOLVING THE CIRCULATORY AND RESPIRATORY SYSTEMS: ICD-10-CM

## 2024-12-17 PROCEDURE — 99203 OFFICE O/P NEW LOW 30 MIN: CPT

## 2024-12-17 PROCEDURE — G2211 COMPLEX E/M VISIT ADD ON: CPT

## 2024-12-17 PROCEDURE — 93000 ELECTROCARDIOGRAM COMPLETE: CPT

## 2024-12-17 RX ORDER — ACETAMINOPHEN 325 MG/1
325 TABLET ORAL
Refills: 0 | Status: DISCONTINUED | COMMUNITY
Start: 2024-12-09 | End: 2024-12-17

## 2024-12-19 ENCOUNTER — APPOINTMENT (OUTPATIENT)
Dept: OBGYN | Facility: CLINIC | Age: 29
End: 2024-12-19
Payer: COMMERCIAL

## 2024-12-19 VITALS — DIASTOLIC BLOOD PRESSURE: 84 MMHG | SYSTOLIC BLOOD PRESSURE: 157 MMHG

## 2024-12-19 PROCEDURE — 0503F POSTPARTUM CARE VISIT: CPT

## 2024-12-23 ENCOUNTER — NON-APPOINTMENT (OUTPATIENT)
Age: 29
End: 2024-12-23

## 2025-01-21 ENCOUNTER — TRANSCRIPTION ENCOUNTER (OUTPATIENT)
Age: 30
End: 2025-01-21

## 2025-01-21 RX ORDER — CLINDAMYCIN AND BENZOYL PEROXIDE 50; 10 MG/G; MG/G
1-5 GEL TOPICAL
Qty: 1 | Refills: 3 | Status: ACTIVE | COMMUNITY
Start: 2025-01-21 | End: 1900-01-01

## 2025-01-21 RX ORDER — CLINDAMYCIN PHOSPHATE 10 MG/ML
1 LOTION TOPICAL
Qty: 1 | Refills: 5 | Status: ACTIVE | COMMUNITY
Start: 2025-01-21 | End: 1900-01-01

## 2025-01-28 ENCOUNTER — APPOINTMENT (OUTPATIENT)
Dept: OBGYN | Facility: CLINIC | Age: 30
End: 2025-01-28
Payer: COMMERCIAL

## 2025-01-28 VITALS — SYSTOLIC BLOOD PRESSURE: 134 MMHG | DIASTOLIC BLOOD PRESSURE: 72 MMHG

## 2025-01-28 PROCEDURE — 0503F POSTPARTUM CARE VISIT: CPT

## 2025-04-22 ENCOUNTER — NON-APPOINTMENT (OUTPATIENT)
Age: 30
End: 2025-04-22

## 2025-05-01 NOTE — OB RN PATIENT PROFILE - PAIN SCALE PREFERRED, PROFILE
Outreach attempts to coordinate scheduling on the patient's Service to Behavioral Health order in workqueue [id number] requested on 4/24/2025 have been conducted.    This order is still valid for one year from date it was placed. Patient may call Central Scheduling at [phone] and we would be happy to assist them.     
numerical 0-10

## 2025-06-16 NOTE — OB RN PATIENT PROFILE - AGENT'S NAME
Pediatric Gastroenterology, Hepatology, and Nutrition    Outpatient follow-up consultation  Consultation requested by: No ref. provider found, for: vomiting, constipation    Diagnoses:  Patient Active Problem List   Diagnosis    Constipation, unspecified constipation type    Rumination disorder of infancy and childhood       Assessment and Plan from last office visit, on 4/24/23:  Josefina is a 5 year old female who initially presented with generalized intermittent abdominal pain, intermittent nonbloody nonbilious emesis, constipation. The family also reported an intermittent rash, and questions an allergic etiology. Referral was placed to allergy/immunology. Abdominal pain is much improved.    Pertinent work-up completed thus far includes:  -On 3/22/2022 celiac serologies were negative, TSH was normal, GGT, lipase, BMP, ESR, liver panel, CBC were normal/reassuring, CRP was elevated to 5.37.  -On 8/25/2021 upper GI study, limited due to patient participation/tolerance  -EGD on 10/20/2022 showed erythematous nodular ulcerated mucosa in the antrum  -Biopsies from EGD showed marked chronic gastritis with H. pylori, culture positive, susceptibility testing failed, completed treatment course successfully  -12/8/22, negative repeat stool H pylori  -KUB 12/6/22 moderate stool burden  -normal UGI study 1/12/23     Today parent reports resolution of vomiting at around the same time Miralax was started. Parent has no specific concerns today. Recommendations were provided to treat constipation, should issues recur in the future, and to improve dietary quality.     Plan:  -for now, Josefina can stay off the Cyproheptadine  -if Josefina has firm/hard stools, or if vomiting recurs, restart Miralax, 1 cap, mixed in 8 oz of clear liquid.  -Dose of Miralax can be adjusted such that Josefina has 1-2 soft stools daily  -fruit/vegetable goal: 4-5 servings/day  -fluid goal: 55 oz per day  -2-3 servings of dairy per day  -no  juice/soda  -minimize sweets/candy (small amounts are OK, start with every other day, and gradually decrease further)  -follow up, as needed    Correspondence and/or Interval History:      -emesis resolved 2 yrs ago  -2 months ago vomiting recurred  -emesis contains 5-10x/d  -contains chunks of food  -volume varies  -NBNB    -diarrhea on Friday and   -stooled once/d  -no blood in stool    -baseline stoolin-2x/d  -no hard stools of late    -abdominal pain, mid abdomen  -after vomiting    -on Adderall for ADHD for the past 3 yrs    Review of Systems:  A 10pt ROS was completed and otherwise negative except as noted above or below.     ROS    Allergies: Josefina has no known allergies.    Medications:   Current Outpatient Medications   Medication Sig Dispense Refill    amphetamine-dextroamphetamine (ADDERALL XR) 5 MG 24 hr capsule Take 1 capsule by mouth every morning.      cyproheptadine 2 MG/5ML syrup Take 5 mLs (2 mg) by mouth At Bedtime 150 mL 3    cyproheptadine 2 MG/5ML syrup Take 5 mLs (2 mg) by mouth every 12 hours as needed for allergies 70 mL 0    omeprazole (PRILOSEC) 20 MG DR capsule Take 1 capsule (20 mg) by mouth 2 times daily 60 capsule 1    ondansetron (ZOFRAN ODT) 4 MG ODT tab Take 1 tablet (4 mg) by mouth every 8 hours as needed for nausea or vomiting 6 tablet 0        Immunizations:  Immunization History   Administered Date(s) Administered    COVID-19 5-11Y ([employer]) 2023        Past Medical History:  I have reviewed this patient's past medical history today and updated it as appropriate.  No past medical history on file.    Past Surgical History: I have reviewed this patient's past surgical history today and updated it as appropriate.  Past Surgical History:   Procedure Laterality Date    ESOPHAGOSCOPY, GASTROSCOPY, DUODENOSCOPY (EGD), COMBINED N/A 10/20/2022    Procedure: ESOPHAGOGASTRODUODENOSCOPY, WITH BIOPSY;  Surgeon: Mario Nj MD;  Location: [facility]      "    Family History:  I have reviewed this patient's family history today and updated it as appropriate.  No family history on file.    Social History: Josefina lives with her mother.    Physical Exam:    /71 (BP Location: Right arm, Patient Position: Sitting, Cuff Size: Adult Small)   Pulse (!) 112   Ht 1.33 m (4' 4.36\")   Wt 40 kg (88 lb 2.9 oz)   BMI 22.61 kg/m     Weight for age: >99 %ile (Z= 2.38) based on CDC (Girls, 2-20 Years) weight-for-age data using data from 6/16/2025.  Height for age: 95 %ile (Z= 1.61) based on CDC (Girls, 2-20 Years) Stature-for-age data based on Stature recorded on 6/16/2025.  BMI for age: 98 %ile (Z= 2.01, 113% of 95%ile) based on CDC (Girls, 2-20 Years) BMI-for-age based on BMI available on 6/16/2025.  Weight for length: Normalized weight-for-recumbent length data not available for patients older than 36 months.    Physical Exam  Constitutional:       General: She is active. She is not in acute distress.     Appearance: She is not toxic-appearing.   HENT:      Head: Atraumatic.      Right Ear: External ear normal.      Left Ear: External ear normal.      Nose: Nose normal.      Mouth/Throat:      Mouth: Mucous membranes are moist.   Eyes:      General:         Right eye: No discharge.         Left eye: No discharge.   Cardiovascular:      Rate and Rhythm: Normal rate and regular rhythm.      Heart sounds: Normal heart sounds. No murmur heard.  Pulmonary:      Effort: Pulmonary effort is normal. No respiratory distress.      Breath sounds: Normal breath sounds.   Abdominal:      General: Bowel sounds are normal. There is no distension.      Palpations: Abdomen is soft. There is no mass.      Tenderness: There is no abdominal tenderness.   Musculoskeletal:         General: No deformity.      Cervical back: Neck supple.   Skin:     General: Skin is warm and dry.      Capillary Refill: Capillary refill takes less than 2 seconds.   Neurological:      General: No focal deficit " present.      Mental Status: She is alert.   Psychiatric:         Behavior: Behavior normal.         Review of outside/previous results:  I personally reviewed results of laboratory evaluation, imaging studies and past medical records that were available during this outpatient visit.      Results for orders placed or performed during the hospital encounter of 10/20/22   UPPER GI ENDOSCOPY     Status: None   Result Value Ref Range    Upper GI Endoscopy       [facility]  Pediatric Endoscopy [facility]  _______________________________________________________________________________  Patient Name: Josefina Pineda          Procedure Date: 10/20/2022 8:11 AM  MRN: [id number]                       Account Number: [id number]  YOB: 2018              Admit Type: Outpatient  Age: 4                                [facility]  Gender: Female                        Note Status: Finalized  Attending MD: TIM COFFEY MD  Total Sedation Time:   Instrument Name:  GIF- [id number] Adult EGD   _______________________________________________________________________________     Procedure:             Upper GI endoscopy  Indications:           Persistent vomiting  Providers:             TIM COFFEY MD, Dimas Truong RN  Referring MD:            Medicines:             See the Anesthesia note for documentation of the                          administered medications  Complicat ions:         No immediate complications.  _______________________________________________________________________________  Procedure:             Pre-Anesthesia Assessment:                         - After reviewing the risks and benefits, the patient                          was deemed in satisfactory condition to undergo the                          procedure.                         After obtaining informed consent, the endoscope was                          passed under direct  vision. Throughout the procedure,                          the patient's blood pressure, pulse, and oxygen                          saturations were monitored continuously. The Endoscope                          was introduced through the mouth, and advanced to the                          second part of duodenum. The upper GI endoscopy was                          accomplished without difficulty. The patient tolerated                          the procedure well.                                                                                    Findings:       The examined esophagus was normal. Biopsies were taken with a cold        forceps for histology.       Localized moderate mucosal changes characterized by erythema, nodularity        and ulceration were found in the gastric antrum. Biopsies were taken        with a cold forceps for histology. Biopsies were taken with a cold        forceps for Helicobacter pylori cultures.       The exam of the stomach was otherwise normal.       The examined duodenum was normal. Biopsies were taken with a cold        forceps for histology.                                                                                   Impression:            - Normal esophagus. Biopsied.                         - Erythematous, nodular and ulcerated mucosa in the                          antrum. Biopsied.                         - Normal examined duodenum. Biopsied.  Recommendation:        - Await pathology results.                         - Use Prilosec (omeprazole)  20 mg PO BID.                                                                                     Electronic Signature by Dr Tim Coffey  ______________________  TIM COFFEY MD  10/20/2022 11:56:00 AM  I was physically present for the entire viewing portion of the exam.  __________________________  Signature of teaching physician  B4c/D4c  Number of Addenda: 0    Note Initiated On: 10/20/2022 8:11 AM  Scope  In:  Scope Out:     Surgical Pathology Exam     Status: None   Result Value Ref Range    Case Report       Peds Surgical Pathology Report                    Case: [id number]                                  Authorizing Provider:  Mario Nj MD    Collected:           10/20/2022 08:38 AM          Ordering Location:     [facility]    Received:            10/20/2022 09:20 AM                                 Sedation Observation                                                         Pathologist:           Sheldon Hurtado MD                                                     Specimens:   A) - Small Intestine, Duodenum, Duodenum and duodenal bulb                                          B) - Stomach, Gastric biopsy                                                                        C) - Stomach, Antrum, Antrum and body                                                               D) - Esophagus, Distal, Esophageal biopsy, distal                                                   E) - Esophagus, Proximal, Esophageal biopsy, proximal                                      Final Diagnosis       A. Duodenum and duodenal bulb, biopsies:     - no pathologic diagnosis.    B. Stomach, biopsies:     - marked chronic gastritis with numerous Helicobacter pylori.    C. Stomach, biopsies:     - patchy chronic gastritis with numerous Helicobacter pylori.    D. Distal esophagus, biopsies:     - no pathologic diagnosis.    E. Proximal esophagus, biopsies:     - no pathologic diagnosis.      Comment       Immunostains for Helicobacter pylori are positive with both stomach biopsies.  Controls are adequate.      Clinical Information       4 year old female presenting with emesis. Endoscopically, localized moderate mucosal changes characterized by erythema, nodularity and ulceration were found in the gastric antrum.      Gross Description       A(1). Small Intestine, Duodenum, Duodenum and duodenal bulb:  The  "specimen is received in formalin with proper patient identification, labeled \"duodenum and duodenal bulb\".  The specimen consists of 3 pieces of tan-pink soft tissue, 0.2 to 0.3 cm in greatest dimension.  Submitted in A1.   B(2). Stomach, Gastric biopsy:  The specimen is received in formalin with proper patient identification, labeled \"gastric biopsy\".  The specimen consists of a 0.4 cm tan-pink soft tissue fragment.  Submitted in B1.   C(3). Stomach, Antrum, Antrum and body:  The specimen is received in formalin with proper patient identification, labeled \"antrum and body\".  The specimen consists of 2 pieces of tan-pink soft tissue, 0.2 and 0.4 cm in greatest dimension.  Submitted in C1.   D(4). Esophagus, Distal, Esophageal biopsy, distal:  The specimen is received in formalin with proper patient identification, labeled \"esophageal biopsy, distal\".  The specimen consists of 3 pieces of pink-tan soft tissue, 0.3 to 0.4 cm in greatest dimension.  Submitted in D1.   E(5). Esophagus, Proximal, Esophageal biopsy, proximal:  The specimen is received in formalin with proper patient identification, labeled \"esophageal biopsy, proximal\".  The specimen consists of 3 pieces of tan-white soft tissue, 0.3 to 0.4 cm in greatest dimension.  Submitted in E1.       Microscopic Description       A microscopic examination was done. The results are reflected in the above diagnoses.  A resident/fellow in an ACGME accredited training program was involved in the initial review, preparation, and/or interpretation of this case.  I, as the senior physician, attest that I: (i) reviewed patient clinical records if indicated; (ii) reviewed relevant lab test results; (iii) examined the relevant preparation(s) for the specimen(s); and (iv) agree with the report, diagnosis(es), and interpretation as documented by the resident/fellow and edited/confirmed by me. Reporting resident/fellow: JESSICA Frazier MD      Performing Labs       The technical " component of this testing was completed at [facility]      Case Images     Helicobacter Pylori Culture     Status: Abnormal    Specimen: Stomach; Tissue   Result Value Ref Range    Culture 1+ Helicobacter pylori (A)          Assessment:    Josefina is a 7 year old female who initially presented with generalized intermittent abdominal pain, intermittent nonbloody nonbilious emesis, constipation.    At a prior appt parent reported resolution of vomiting at around the same time Miralax was started    Pertinent work-up completed thus far includes:  -On 3/22/2022 celiac serologies were negative, TSH was normal, GGT, lipase, BMP, ESR, liver panel, CBC were normal/reassuring, CRP was elevated to 5.37.  -On 8/25/2021 upper GI study, limited due to patient participation/tolerance  -EGD on 10/20/2022 showed erythematous nodular ulcerated mucosa in the antrum  -Biopsies from EGD showed marked chronic gastritis with H. pylori, culture positive, susceptibility testing failed, completed treatment course successfully  -12/8/22, negative repeat stool H pylori  -KUB 12/6/22 moderate stool burden  -normal UGI study 1/12/23     After 2 years of being asymptomatic, Patricias nonbloody nonbilious emesis has recurred.  Additionally the family also reports 2 days of diarrhea over the past week, resolution of constipation, and new abdominal pain associated with vomiting.    A stool enteric panel will be obtained to screen for infection given history of diarrhea.  Stool H. pylori will be obtained to screen for recurrence of previously treated H. pylori infection.  Since constipation was an issue for her in the past, we will obtain an abdominal x-ray.    An episode of emesis was witnessed in the office today.  This had features of rumination [not forceful, not associated with discomfort, small-volume-although volumes can be larger per parent].  We discussed rumination disorder at  length. At this at this point I do not see a strong indication to repeat the upper endoscopy.  Deep breathing exercises were instructed.  Cyproheptadine can be restarted, although I am worried that this will worsen her rapid weight gain.    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  -no further testing is needed to confirm this diagnosis  -rumination can be treated with deep breathing exercises - instructed today (practice for 5 minutes every morning, and re-attempt these exercises every time Josefina feels like she is going to bring up food)  -if this is not helpful in a few weeks, we could start Josefina on Cyproheptadine to see if this helps  -Josefina's weight trends are not concerning currently, but please let us know if Josefina loses weight  -follow up in 3 months    Orders today--  Orders Placed This Encounter   Procedures    X-ray Abdomen 1 vw    Helicobacter pylori Antigen Stool    Enteric Bacteria and Virus Panel by YURIY Stool       Follow up:     Peds GI Clinic Follow-Up Order (Blank)   Expected date:  Sep 16, 2025   (Approximate)      Follow Up Appointment Details:     Follow-Up with Whom?: Me    Is this an as needed follow-up?: No    Follow-Up for What?: GI    How?: In-Person    Can this be self-scheduled online?: Yes                 Please call or return sooner should Josefina become symptomatic.      Thank you for allowing me to participate in Josefina's care.   If you have any questions during regular office hours, please contact the nurse line at [phone].  If acute concerns arise after hours, you can call [phone] and ask to speak to the pediatric  gastroenterologist on call.    If you have scheduling needs, please call the Call Center at [phone].   Outside lab and imaging results should be faxed to [phone].    Sincerely,    Mario Nj MD, [facility]    Pediatric Gastroenterology, Hepatology, and Nutrition  [facility]       I discussed the plan of care with Josefina and her mother during today's office visit. We discussed: symptoms, differential diagnosis, diagnostic work up, treatment, potential side effects and complications, and follow up plan.  Questions were answered and contact information provided.    At least 30 minutes spent on the date of the encounter doing chart review, history and exam, documentation and further activities as noted above.     The longitudinal plan of care for the diagnosis(es)/condition(s) as documented were addressed during this visit. Due to the added complexity in care, I will continue to support Josefina in the subsequent management and with ongoing continuity of care.      CC  Copy to patient  [facility]   [address] [address]    Patient Care Team:  Sophia Solorio MD as PCP - General Schwab, Briana, RN as Nurse Coordinator  Gem Estevez, PhD LP as Assigned Behavioral Health Provider  Tiff Boone MD as Assigned Pediatric Specialist Provider         Nacho Fuentes

## 2025-07-14 NOTE — PROCEDURAL SAFETY CHECKLIST WITH OR WITHOUT SEDATION - [id number]_GEN_ALL_CORE
Subjective   Love Lanza is a 45 y.o. female.      History of Present Illness   The patient is here today to F/U on insomnia.     Insomnia- Pt is doing well with current medication regimen, denies adverse reactions, compliant with medication schedule.     Right foot with spasticitiy- the robaxin did not help at all. This is the first med she has tried, requesting alt. She denies SEs.    The following portions of the patient's history were reviewed and updated as appropriate: allergies, current medications, past family history, past medical history, past social history, past surgical history and problem list.    Review of Systems   Constitutional:  Negative for chills and fever.   Respiratory: Negative.     Cardiovascular: Negative.    Musculoskeletal:  Positive for myalgias.   Psychiatric/Behavioral:  Positive for sleep disturbance. Negative for dysphoric mood and suicidal ideas. The patient is not nervous/anxious.        Objective   Physical Exam  Constitutional:       Appearance: Normal appearance. She is well-developed.   Neck:      Thyroid: No thyromegaly.   Cardiovascular:      Rate and Rhythm: Normal rate and regular rhythm.      Heart sounds: Normal heart sounds.   Pulmonary:      Effort: Pulmonary effort is normal.      Breath sounds: Normal breath sounds.   Musculoskeletal:      Cervical back: Normal range of motion and neck supple.   Lymphadenopathy:      Cervical: No cervical adenopathy.   Skin:     General: Skin is warm and dry.   Neurological:      Mental Status: She is alert.   Psychiatric:         Behavior: Behavior normal.         Thought Content: Thought content normal.         Judgment: Judgment normal.         Vitals:    07/14/25 0832   BP: 98/68   Pulse: 69   SpO2: 98%     Body mass index is 21.85 kg/m².    Current Outpatient Medications:     lamoTRIgine (LaMICtal) 150 MG tablet, Take 1 tablet by mouth 2 (Two) Times a Day., Disp: , Rfl:     levETIRAcetam (KEPPRA) 500 MG tablet, Take 1  tablet by mouth 2 (Two) Times a Day., Disp: , Rfl:     Levonorgestrel (KYLEENA) 19.5 MG intrauterine device IUD, 1 each by Intrauterine route Every 5 (Five) Years., Disp: , Rfl:     Urea 20 Intensive Hydrating 20 % cream, , Disp: , Rfl:     vitamin D (ERGOCALCIFEROL) 1.25 MG (61758 UT) capsule capsule, , Disp: , Rfl:     zolpidem (Ambien) 10 MG tablet, Take 1 tablet by mouth At Night As Needed for Sleep. Need to give 72 hours notice for refill next time. Do not wait until script is out.  Indications: not due yet, pt request, please hold till due, Disp: 30 tablet, Rfl: 0     Assessment & Plan   Diagnoses and all orders for this visit:    1. Insomnia, unspecified type (Primary)  -     zolpidem (Ambien) 10 MG tablet; Take 1 tablet by mouth At Night As Needed for Sleep. Need to give 72 hours notice for refill next time. Do not wait until script is out.  Indications: not due yet, pt request, please hold till due  Dispense: 30 tablet; Refill: 0    2. Spasticity               1. Insomnia- she is doing well with the ambien, aware of approp use and SEs,   2. Muscle spasms- neuro has suggested a PMNR referral- she has not tried this yet- will message with neuro and ask for rec.    done

## 2025-09-02 ENCOUNTER — NON-APPOINTMENT (OUTPATIENT)
Age: 30
End: 2025-09-02

## 2025-09-04 ENCOUNTER — LABORATORY RESULT (OUTPATIENT)
Age: 30
End: 2025-09-04

## 2025-09-04 ENCOUNTER — APPOINTMENT (OUTPATIENT)
Dept: INTERNAL MEDICINE | Facility: CLINIC | Age: 30
End: 2025-09-04
Payer: COMMERCIAL

## 2025-09-04 VITALS
OXYGEN SATURATION: 99 % | HEART RATE: 83 BPM | BODY MASS INDEX: 21 KG/M2 | SYSTOLIC BLOOD PRESSURE: 127 MMHG | HEIGHT: 64 IN | WEIGHT: 123 LBS | DIASTOLIC BLOOD PRESSURE: 75 MMHG | TEMPERATURE: 98.1 F

## 2025-09-04 DIAGNOSIS — Z00.00 ENCOUNTER FOR GENERAL ADULT MEDICAL EXAMINATION W/OUT ABNORMAL FINDINGS: ICD-10-CM

## 2025-09-04 PROCEDURE — 36415 COLL VENOUS BLD VENIPUNCTURE: CPT

## 2025-09-04 PROCEDURE — 99395 PREV VISIT EST AGE 18-39: CPT

## 2025-09-05 ENCOUNTER — TRANSCRIPTION ENCOUNTER (OUTPATIENT)
Age: 30
End: 2025-09-05

## 2025-09-05 LAB
25(OH)D3 SERPL-MCNC: 66.4 NG/ML
ALBUMIN SERPL ELPH-MCNC: 4.9 G/DL
ALP BLD-CCNC: 75 U/L
ALT SERPL-CCNC: 15 U/L
ANION GAP SERPL CALC-SCNC: 13 MMOL/L
APPEARANCE: CLEAR
AST SERPL-CCNC: 19 U/L
BASOPHILS # BLD AUTO: 0.03 K/UL
BASOPHILS NFR BLD AUTO: 0.5 %
BILIRUB SERPL-MCNC: 0.4 MG/DL
BILIRUBIN URINE: NEGATIVE
BLOOD URINE: NEGATIVE
BUN SERPL-MCNC: 15 MG/DL
CALCIUM SERPL-MCNC: 9.8 MG/DL
CHLORIDE SERPL-SCNC: 102 MMOL/L
CHOLEST SERPL-MCNC: 186 MG/DL
CO2 SERPL-SCNC: 24 MMOL/L
COLOR: YELLOW
CREAT SERPL-MCNC: 0.65 MG/DL
EGFRCR SERPLBLD CKD-EPI 2021: 121 ML/MIN/1.73M2
EOSINOPHIL # BLD AUTO: 0.07 K/UL
EOSINOPHIL NFR BLD AUTO: 1.2 %
ESTIMATED AVERAGE GLUCOSE: 100 MG/DL
FOLATE SERPL-MCNC: >20 NG/ML
GLUCOSE QUALITATIVE U: NEGATIVE MG/DL
GLUCOSE SERPL-MCNC: 106 MG/DL
HBA1C MFR BLD HPLC: 5.1 %
HCT VFR BLD CALC: 42.3 %
HDLC SERPL-MCNC: 55 MG/DL
HGB BLD-MCNC: 14 G/DL
IMM GRANULOCYTES NFR BLD AUTO: 0.2 %
KETONES URINE: NEGATIVE MG/DL
LDLC SERPL-MCNC: 107 MG/DL
LEUKOCYTE ESTERASE URINE: ABNORMAL
LYMPHOCYTES # BLD AUTO: 2.53 K/UL
LYMPHOCYTES NFR BLD AUTO: 43.8 %
MAN DIFF?: NORMAL
MCHC RBC-ENTMCNC: 30.4 PG
MCHC RBC-ENTMCNC: 33.1 G/DL
MCV RBC AUTO: 91.8 FL
MONOCYTES # BLD AUTO: 0.36 K/UL
MONOCYTES NFR BLD AUTO: 6.2 %
NEUTROPHILS # BLD AUTO: 2.78 K/UL
NEUTROPHILS NFR BLD AUTO: 48.1 %
NITRITE URINE: NEGATIVE
NONHDLC SERPL-MCNC: 131 MG/DL
PH URINE: 6.5
PLATELET # BLD AUTO: 299 K/UL
POTASSIUM SERPL-SCNC: 4.5 MMOL/L
PROT SERPL-MCNC: 7.5 G/DL
PROTEIN URINE: NEGATIVE MG/DL
RBC # BLD: 4.61 M/UL
RBC # FLD: 12.6 %
SODIUM SERPL-SCNC: 139 MMOL/L
SPECIFIC GRAVITY URINE: 1.02
TRIGL SERPL-MCNC: 137 MG/DL
TSH SERPL-ACNC: 2.52 UIU/ML
UROBILINOGEN URINE: 0.2 MG/DL
VIT B12 SERPL-MCNC: 529 PG/ML
WBC # FLD AUTO: 5.78 K/UL

## 2025-09-08 ENCOUNTER — TRANSCRIPTION ENCOUNTER (OUTPATIENT)
Age: 30
End: 2025-09-08

## 2025-09-09 ENCOUNTER — TRANSCRIPTION ENCOUNTER (OUTPATIENT)
Age: 30
End: 2025-09-09

## 2025-09-09 DIAGNOSIS — N39.0 URINARY TRACT INFECTION, SITE NOT SPECIFIED: ICD-10-CM

## 2025-09-09 RX ORDER — CEPHALEXIN 500 MG/1
500 CAPSULE ORAL
Qty: 14 | Refills: 0 | Status: ACTIVE | COMMUNITY
Start: 2025-09-09 | End: 1900-01-01

## (undated) DEVICE — DRSG DERMABOND PRINEO 22CM